# Patient Record
Sex: MALE | Employment: FULL TIME | URBAN - METROPOLITAN AREA
[De-identification: names, ages, dates, MRNs, and addresses within clinical notes are randomized per-mention and may not be internally consistent; named-entity substitution may affect disease eponyms.]

---

## 2019-04-01 NOTE — PROGRESS NOTES
SUBJECTIVE:   Stewart Painter is a 28 year old male who presents to clinic today to establish care and for annual wellness exam.    # Insomnia  - sleep is inconsistent  - has tried for the past few weeks to maintain a set time to go to sleep and wake up without significant improvement  - typically goes to sleep at 12:30  - typically gets up at 08:30 but if he had a particularly restless night will stay in bed until 10:00  -  Some weeks will have a few nights will wake up one or a few times and have the belief that he is somewhere else, usually related to some recent event or stressor. For example, recently went fishing and that week after he returned home would wake up and have belief that he was still fishing. After a couple of minutes will realize he's at home and return to bed.   - does not feel that he is necessarily acting out a dream as he is awake and aware of what he is doing during this times  - will sometimes have the impression that there is something he should be doing  - no visual or auditory hallucinations that he remembers  - also has intermittent shaking of his right foot throughout the night (has recorded himself sleeping and seen it and has also been commented on by others in the past). As far as he is aware this is not disturbing his sleep  - no other nocturnal awakenings  - when he wakes in the morning, bed sheets are not particularly disheveled and sleep partners in the past have not mentioned that he is particularly active when sleeping    - typically one cup of coffee in the morning and no other caffeine the rest of the night  - one or two alcoholic drinks occasionally (average once a week)  - takes 0.5mg melatonin occasionally for sleep, no other sleep aids     # Health Maintenance  - HIV Screening: negative in April 2018, declines repeat testing  - STI Screening: declines  - BP:   BP Readings from Last 3 Encounters:   04/02/19 138/78   - Cholesterol: no indication  - Diabetes Screening: will  do today  - (+) seatbelt use, (+) helmet, (+) smoke detector  - Exercise: bikes and weight lifting 6 days a week    Today's PHQ-2 Score:   PHQ-2 ( 1999 Pf[employer]) 4/2/2019   Q1: Little interest or pleasure in doing things 0   Q2: Feeling down, depressed or hopeless 0   PHQ-2 Score 0     Review of Systems:   Constitutional - no fevers, chills, night sweats, unintentional weight loss/gain   Eyes - no vision concerns   Ears/Nose/Throat - no hearing concerns, no dysphagia/odynophagia   Cardiovascular - no chest pain, palpitations   Pulmonary - no shortness of breath, wheezing, coughing   GI - no abdominal pain, constipation, diarrhea, nausea, vomiting    - no dysuria, polyuria, hematuria   Musculoskeletal - no joint or muscle pain  Integument - no rash   Neuro - no weakness, numbness, paresthesia   Heme - no easy bruising/bleeding   Endocrine - no polyuria, weight loss/gain, dry skin, excessive sweating, hair loss   Psychiatric - no feelings of depressed mood or anhedonia in past 2 weeks   Allergic/Immunologic - no history of anaphylaxis, no history of recurrent infections    Past Medical History:   Diagnosis Date     Single kidney      Past Surgical History:   Procedure Laterality Date     NEPHRECTOMY Right     soon after birth     Family History   Problem Relation Age of Onset     Hypertension Mother      No Known Problems Father      No Known Problems Sister      Heart Disease Maternal Grandfather 86     Heart Disease Paternal Grandmother 87     Heart Disease Paternal Grandfather 62     Diabetes No family hx of      Prostate Cancer No family hx of      Colon Cancer No family hx of      Social History     Tobacco Use     Smoking status: Never Smoker     Smokeless tobacco: Never Used   Substance Use Topics     Alcohol use: Yes     Frequency: 2-4 times a month     Drinks per session: 1 or 2     Binge frequency: Never     Drug use: No     Social History     Social History Germán    Works as a  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      I have reviewed the patient's past medical, surgical, family, and social history.     OBJECTIVE:   /78 (BP Location: Right arm, Patient Position: Sitting, Cuff Size: Adult Regular)   Pulse 59   Temp 97.7  F (36.5  C) (Oral)   Resp 16   Ht 1.954 m (6' 4.93\")   Wt 91.5 kg (201 lb 12 oz)   SpO2 99%   BMI 23.97 kg/m      Constitutional: well-appearing, appears stated age  Eyes: conjunctivae without erythema, sclera anicteric. Pupils equal, round, and reactive to light.   ENT: oropharynx clear, TM grey bilateral  Cardiac: regular rate and rhythm, normal S1/S2, no murmur/rubs/gallops  Respiratory: lungs clear to auscultation bilaterally, normal work of breathing, no wheezes/crackles  GI: abdomen soft, non-tender, non-distended  Extremities: warm and well perfused, radial pulses 2+ and equal, cap refill brisk.  Lymph: no cervical or supraclavicular lymphadenopathy  Skin: no rashes, lesions, or wounds  Psych: affect is full and appropriate, speech is fluent and non-pressured    ASSESSMENT AND PLAN:     COUNSELING:   Reviewed preventive health counseling, as reflected in patient instructions       Regular exercise       Healthy diet/nutrition    (Z00.00) Visit for well man health check  (primary encounter diagnosis)  Comment: Age appropriate screening and counseling provided. Non-smoker. Depression screening negative. UTD on vaccines. Diabetes screening today. No indication for early lipid testing. Physically active, normal BMI, no worrisome family history.   Plan: Hemoglobin A1c ([facility])          (G47.19) Excessive daytime sleepiness  (G47.50) Parasomnia  Comment: Interesting history of what sounds to " be a parasomnia and periodic limb movement. Will have him meet with sleep to discuss PSG.   Plan: SLEEP EVALUATION & MANAGEMENT REFERRAL - ADULT         -Other (Respond in commments)    (Z90.5) Single kidney  Comment: Check baseline creatinine but historically has been stable with yearly checks.   Plan: Basic metabolic panel          (R03.0) Elevated blood pressure reading without diagnosis of hypertension  Comment: High for his age and overall health. I have asked that he purchase an omron BP cuff and begin weekly self monitoring with instructions to call the clinic if his SBP >130 at home or DBP >80 on a regular basis.   Plan: Basic metabolic panel          Kaiser Davila  [facility]  04/02/2019, 8:11 AM

## 2019-04-02 ENCOUNTER — OFFICE VISIT (OUTPATIENT)
Dept: FAMILY MEDICINE | Facility: CLINIC | Age: 29
End: 2019-04-02
Payer: COMMERCIAL

## 2019-04-02 VITALS
TEMPERATURE: 97.7 F | OXYGEN SATURATION: 99 % | RESPIRATION RATE: 16 BRPM | HEIGHT: 77 IN | DIASTOLIC BLOOD PRESSURE: 78 MMHG | HEART RATE: 59 BPM | SYSTOLIC BLOOD PRESSURE: 138 MMHG | BODY MASS INDEX: 23.82 KG/M2 | WEIGHT: 201.75 LBS

## 2019-04-02 DIAGNOSIS — Z90.5 SINGLE KIDNEY: ICD-10-CM

## 2019-04-02 DIAGNOSIS — G47.19 EXCESSIVE DAYTIME SLEEPINESS: ICD-10-CM

## 2019-04-02 DIAGNOSIS — R03.0 ELEVATED BLOOD PRESSURE READING WITHOUT DIAGNOSIS OF HYPERTENSION: ICD-10-CM

## 2019-04-02 DIAGNOSIS — Z00.00 VISIT FOR WELL MAN HEALTH CHECK: Primary | ICD-10-CM

## 2019-04-02 DIAGNOSIS — G47.50 PARASOMNIA: ICD-10-CM

## 2019-04-02 LAB
ANION GAP SERPL CALCULATED.3IONS-SCNC: 7 MMOL/L (ref 3–14)
BUN SERPL-MCNC: 24 MG/DL (ref 7–30)
CALCIUM SERPL-MCNC: 9.1 MG/DL (ref 8.5–10.1)
CHLORIDE SERPL-SCNC: 106 MMOL/L (ref 94–109)
CO2 SERPL-SCNC: 27 MMOL/L (ref 20–32)
CREAT SERPL-MCNC: 1.16 MG/DL (ref 0.66–1.25)
GFR SERPL CREATININE-BSD FRML MDRD: 84 ML/MIN/{1.73_M2}
GLUCOSE SERPL-MCNC: 91 MG/DL (ref 70–99)
HBA1C MFR BLD: 5.2 % (ref 4.1–5.7)
POTASSIUM SERPL-SCNC: 4.3 MMOL/L (ref 3.4–5.3)
SODIUM SERPL-SCNC: 140 MMOL/L (ref 133–144)

## 2019-04-02 RX ORDER — MULTIVITAMIN WITH IRON
300 TABLET ORAL DAILY
COMMUNITY

## 2019-04-02 SDOH — HEALTH STABILITY: MENTAL HEALTH: HOW MANY STANDARD DRINKS CONTAINING ALCOHOL DO YOU HAVE ON A TYPICAL DAY?: 1 OR 2

## 2019-04-02 SDOH — HEALTH STABILITY: MENTAL HEALTH: HOW OFTEN DO YOU HAVE 6 OR MORE DRINKS ON ONE OCCASION?: NEVER

## 2019-04-02 SDOH — HEALTH STABILITY: MENTAL HEALTH: HOW OFTEN DO YOU HAVE A DRINK CONTAINING ALCOHOL?: 2-4 TIMES A MONTH

## 2019-04-02 ASSESSMENT — MIFFLIN-ST. JEOR: SCORE: 1996.38

## 2019-04-17 ENCOUNTER — PRE VISIT (OUTPATIENT)
Dept: SLEEP MEDICINE | Facility: CLINIC | Age: 29
End: 2019-04-17

## 2019-04-17 NOTE — TELEPHONE ENCOUNTER
1.  Reason for the visit:  Insomnia  - sleep is inconsistent  - has tried for the past few weeks to maintain a set time to go to sleep and wake up without significant improvement  - typically goes to sleep at 12:30  - typically gets up at 08:30 but if he had a particularly restless night will stay in bed until 10:00  -  Some weeks will have a few nights will wake up one or a few times and have the belief that he is somewhere else, usually related to some recent event or stressor. For example, recently went fishing and that week after he returned home would wake up and have belief that he was still fishing. After a couple of minutes will realize he's at home and return to bed.   - does not feel that he is necessarily acting out a dream as he is awake and aware of what he is doing during this times  - will sometimes have the impression that there is something he should be doing  - no visual or auditory hallucinations that he remembers  - also has intermittent shaking of his right foot throughout the night (has recorded himself sleeping and seen it and has also been commented on by others in the past). As far as he is aware this is not disturbing his sleep  - no other nocturnal awakenings  - when he wakes in the morning, bed sheets are not particularly disheveled and sleep partners in the past have not mentioned that he is particularly active when sleeping     - typically one cup of coffee in the morning and no other caffeine the rest of the night  - one or two alcoholic drinks occasionally (average once a week)  - takes 0.5mg melatonin occasionally for sleep, no other sleep aids    2.  Referring provider and clinic name:  Dr. Davila [facility]  3.  Previous Sleep Doctor or Pulmonlogist (clinic name)?  None  4.  Records, Procedures, Imaging, and Labs (see below)  No records to obtain        All NOTES from previous office visits that pertain to why they are being seen in the Sleep Center    Previous  "Sleep Studies, Chest CT, Echos and reports that pertain to why they are seeing Sleep Center    All Sleep records that have been done in the last 2 years that pertain to why they are seeing Sleep Center            Are they being seen for continuation of care for Cpap/Bipap/Avap/Trilogy/Dental Device? none    If yes to above Who and Where was Device issued/currently getting supplies from? na    Are you currently on \"Supplemental Oxygen\" during the day or night?   na                                                                                                                                                      Please remind pt to bring Cpap machine and ask to arrive 15 minutes early to appointment due traffic and congestion                                                 5. Pt Sleep Center Packet received Message left asking pt to arrive 30 minutes early to appointment if no packet received.        Yes: \"please make sure that you bring this to your appointment completed, either the doctor will not see you until this completed or you may be asked to reschedule your appointment.\"     No: mail or email to the pt and explain, \"please make sure that you bring this to your appointment completed, either the doctor will not see you until this completed or you may be asked to reschedule your appointment.\"     ~If pt coming early to fill packet out, ask that they come 30 minutes prior to their appointment~     6. Has the pt's medication list been updated and preferred pharmacy added?     7. Has the allergy list been reviewed?    \"Thank you for choosing [facility] and we look forward to seeing you at your upcoming appointment\"   "

## 2019-04-25 ENCOUNTER — OFFICE VISIT (OUTPATIENT)
Dept: SLEEP MEDICINE | Facility: CLINIC | Age: 29
End: 2019-04-25
Attending: FAMILY MEDICINE
Payer: COMMERCIAL

## 2019-04-25 ENCOUNTER — MYC MEDICAL ADVICE (OUTPATIENT)
Dept: SLEEP MEDICINE | Facility: CLINIC | Age: 29
End: 2019-04-25

## 2019-04-25 VITALS
HEIGHT: 77 IN | WEIGHT: 203 LBS | BODY MASS INDEX: 23.97 KG/M2 | SYSTOLIC BLOOD PRESSURE: 119 MMHG | HEART RATE: 63 BPM | OXYGEN SATURATION: 98 % | RESPIRATION RATE: 16 BRPM | DIASTOLIC BLOOD PRESSURE: 75 MMHG

## 2019-04-25 DIAGNOSIS — G47.8 NON-RESTORATIVE SLEEP: ICD-10-CM

## 2019-04-25 DIAGNOSIS — G25.81 RESTLESS LEGS SYNDROME (RLS): Primary | ICD-10-CM

## 2019-04-25 DIAGNOSIS — G47.51 CONFUSIONAL AROUSALS: ICD-10-CM

## 2019-04-25 PROCEDURE — 99215 OFFICE O/P EST HI 40 MIN: CPT | Performed by: NURSE PRACTITIONER

## 2019-04-25 ASSESSMENT — MIFFLIN-ST. JEOR: SCORE: 2003.18

## 2019-04-25 NOTE — PROGRESS NOTES
CC:-Asked to see Stewart Painter (prefers Stewart)  by provider Raul Davila in consultation for problems with nonrestorative sleep with fatigue or tiredness and awakening with a headache, complaints of foot twitching, and video recorded evidence of parasomnias      HPI:Symptoms: He has been partaking in a multi faceted number of programs to address his sleep problems over the last few months.  He has been keeping sleep diaries and following rules with regard to amount of alcohol, use of bedtime, and tracking his feelings of fatigue and nonrestorative sleep along with a headache on a regular basis.  He does feel that on weekends his sleep is likely better.  Reports a long history of parasomnias with not leaving the bed.  Currently he experiences these behaviors most likely nights between Sunday and Thursday occurring within the first third of the night and may consist of leaning over the edge of the bed to arrange something on the floor or sitting up and appearing to be having a conversation.  He does have a predisposition for parasomnias with a family history of sleepwalking he is likely primed for these events by possibly alcohol within 3 to 4 hours of bedtime, and a regular sleep-wake schedule, or insufficient sleep at times.  The events are likely precipitated with stress and/or his restless legs.  At this point he has not left the bed.    He does complain of usually right foot twitching that occurs as he starts to relax at the end of the day.  This may persist throughout his attempt to fall asleep or he feels like it may start up after he has fallen asleep it is always gone when he wakes up in the morning, he is also experiencing this if he should be on a plane ride.  He denies any difficulties with sleep apnea hypercapnia syndrome symptoms with the exception of occasionally a dry throat in the morning.      Schedule: He gets into bed because he feels he is ready for sleep, somewhere between 1130 and 12 on  most work days, on occasion between 1230 and 1 in the morning.  Weekends enters bed between 1 and 2 AM.  Exits bed between 9 and 1030 on workdays.  He has a meeting that starts at 11 AM and he needs to be up for that.  The decision to sleep later in the morning is based on how he feels when he gets up at 9: Headache, fatigue, excessive sleepiness at that time will have him go back to bed and reset the alarm for 1030.  Weekends exits bed between 10 and 11 AM.  Wake ups 1-5 times per night that he knows of he remembers them intentionally for the next morning, but currently is not checking the clock, when he has to wake up he does not ruminate nor does he go through to do this.  On his mind may be the impact on his training (physical training and).  He also may wake up due to psychological issues which includes problems with work projects or could be discomfort.  Total sleep time is estimated on work nights between 5 and 6 hours, and leak in 7 to 8 hours.  He feels a specific SH, does nap 4-5 times a week for about 20 to 30 minutes.  Feels better after a nap.  Activities in bed to include reading and using a Odette that he is changed the back light to have the least amount of effect on his sleep.    Studies: None      Personal, social and Family hx: He reports that on bad mornings he will have a headache and that his performance at work will be less.  20 out of 30 days he is tired and fatigued, 5 out of 30 days he may have some concerns about work but he denies a diagnosis of anxiety or depression.  Alcohol use 1-2 nights out of the work week he may have 1-2 drinks between 7 and 8 PM; on 2 out of 4 weekends will have between 3 and 4 drinks per night and is usually done by 11 PM.  [place] Sleepiness Scale is 8/24, PALOMA scale is 13 out of 28 and he does state that this is improved with some of the sleep hygiene measures that he has been doing.  Family history: Sleep apnea, snoring and sleep walking  ...Scanned in sleep  consult note reviewed:    ROS 14 point comprehensive review of systems completed negative with exception of the following headaches  Status post nephrectomy with a normal creatinine at this point in time.      Allergies:    No Known Allergies    Medications:    Current Outpatient Medications   Medication Sig Dispense Refill     CREATINE PO Take 5 mg by mouth daily       magnesium oxide 200 MG TABS Take 400 mg by mouth nightly as needed       melatonin 1 MG TABS tablet Take 0.5 mg by mouth nightly as needed for sleep       Omega-3 Fatty Acids (FISH OIL CONCENTRATE) 300 MG CAPS Take 300 mg by mouth daily         Problem List:  There are no active problems to display for this patient.       Past Medical/Surgical History:  Past Medical History:   Diagnosis Date     Acute renal failure (ARF) (H) 1989    Hospitalized at [place]     Single kidney      Past Surgical History:   Procedure Laterality Date     NEPHRECTOMY Right     soon after birth       Family History   Problem Relation Age of Onset     Hypertension Mother      No Known Problems Father      No Known Problems Sister      Heart Disease Maternal Grandfather 86     Heart Disease Paternal Grandmother 87     Heart Disease Paternal Grandfather 62     Diabetes No family hx of      Prostate Cancer No family hx of      Colon Cancer No family hx of      Social History     Socioeconomic History     Marital status: Single     Spouse name: Not on file     Number of children: Not on file     Years of education: Not on file     Highest education level: Not on file   Occupational History     Not on file   Social Needs     Financial resource strain: Not on file     Food insecurity:     Worry: Not on file     Inability: Not on file     Transportation needs:     Medical: Not on file     Non-medical: Not on file   Tobacco Use     Smoking status: Never Smoker     Smokeless tobacco: Never Used   Substance and Sexual Activity     Alcohol use: Yes     Frequency: 2-4 times a month      "Drinks per session: 1 or 2     Binge frequency: Never     Drug use: No     Sexual activity: Not Currently     Partners: Female     Birth control/protection: None   Lifestyle     Physical activity:     Days per week: Not on file     Minutes per session: Not on file     Stress: Not on file   Relationships     Social connections:     Talks on phone: Not on file     Gets together: Not on file     Attends Baptism service: Not on file     Active member of club or organization: Not on file     Attends meetings of clubs or organizations: Not on file     Relationship status: Not on file     Intimate partner violence:     Fear of current or ex partner: Not on file     Emotionally abused: Not on file     Physically abused: Not on file     Forced sexual activity: Not on file   Other Topics Concern     Not on file   Social History Narrative    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    Neck Cir (cm): 38 cm    [place] Total Score 4/25/2019   Total score - [place] 8       GENERAL APPEARANCE: healthy, alert and no distress    Assessment/Plan: Is my impression that carried a 28-year-old  has nonrestorative sleep due to the following reasons: RLS, irregular sleep-wake schedule, and confusional arousals.  He has videotaped his confusional arousals over a 1 week period of time and had several in 7 days.  Most individuals who do struggle with confusional arousals to complain of tiredness and fatigue.  In the following plan of care has been developed    1.  RLS: I have ordered a fasting ferritin and TIBC to occur in a routine fashion in the meantime I have suggested he add gabapentin 100 mg 1 p.o. 1.5 hours before bedtime the night, increasing by 1 pill every 3 to 5 days until he " reaches 300 mg.  He will call to update me on the name of his pharmacy that he wishes to use.  He will also involve himself in measures and I will communicate through my chart with him as is necessary  2.  Confusional arousals: I have discussed Pressman's theory of parasomnias which include predisposing priming and precipitating components that bring about these behaviors.  Patient does agree that this is likely a significant issue for him and that treating his restless legs and also regulating his sleep schedule will likely improve his quality of sleep as well as decrease the frequency of these events.  3.  Nonrestorative sleep: See #1 and #2 above I have him keeping sleep diaries for approximately 4 weeks and will see me back in clinic if measures to improve #1 and #2 above have not improved his quality of sleep.    Thank you for allowing me to participate in this kind man's care time spent with patient 60 minutes of which greater than 50% was spent in counseling education and coordination of care. end of dictation          CC: Kaiser Davila

## 2019-04-25 NOTE — PATIENT INSTRUCTIONS
Avoid alcohol within 3-4 hrs of bedtime    The following is recommended:  1)  Fasting ferritin and TIBC.     Ferrous sulfate 325mg and 500mg of vitamin C _____ daily. Most common side effects is constipation, which can be treated with increasing water and fiber or adding miralax if needed.   An iron infusion is also a possibility, but there are some side effects of this treatment with rare but possible anaphylaxis, so most of my patients elect ferrous sulfate and add gabapentin till the ferritin level rises.    Other treatments that can work well is developing a routine of stretching legs, warm bath, massage of legs with lotion, all before bedtime.  Avoiding excessive caffeine, alcohol can help as well.    Gabapentin 100mg pill, 1.5 hrs before bedtime. 1 pill for 3-5 days, if needed increase to 2 pills, then after 3-5 days can increase to 3 pills if needed.  No driving after taking gabapentin.     Let me know what pharmacy you would like this to go to.  My chart me with your pharmacy    Continue to:  Use bed for sleep or sex  Avoiding devices in bed    Trying:  Winding down period before bedtime with something relaxing,enjoyable, mentally distracting to avoid work thoughts    Cancel the follow up if not needed.      Your BMI is Body mass index is 24.07 kg/m .  Weight management is a personal decision.  If you are interested in exploring weight loss strategies, the following discussion covers the approaches that may be successful. Body mass index (BMI) is one way to tell whether you are at a healthy weight, overweight, or obese. It measures your weight in relation to your height.  A BMI of 18.5 to 24.9 is in the healthy range. A person with a BMI of 25 to 29.9 is considered overweight, and someone with a BMI of 30 or greater is considered obese. More than two-thirds of American adults are considered overweight or obese.  Being overweight or obese increases the risk for further weight gain. Excess weight may lead to  heart disease and diabetes.  Creating and following plans for healthy eating and physical activity may help you improve your health.  Weight control is part of healthy lifestyle and includes exercise, emotional health, and healthy eating habits. Careful eating habits lifelong are the mainstay of weight control. Though there are significant health benefits from weight loss, long-term weight loss with diet alone may be very difficult to achieve- studies show long-term success with dietary management in less than 10% of people. Attaining a healthy weight may be especially difficult to achieve in those with severe obesity. In some cases, medications, devices and surgical management might be considered.  What can you do?  If you are overweight or obese and are interested in methods for weight loss, you should discuss this with your provider.     Consider reducing daily calorie intake by 500 calories.     Keep a food journal.     Avoiding skipping meals, consider cutting portions instead.    Diet combined with exercise helps maintain muscle while optimizing fat loss. Strength training is particularly important for building and maintaining muscle mass. Exercise helps reduce stress, increase energy, and improves fitness. Increasing exercise without diet control, however, may not burn enough calories to loose weight.       Start walking three days a week 10-20 minutes at a time    Work towards walking thirty minutes five days a week     Eventually, increase the speed of your walking for 1-2 minutes at time    In addition, we recommend that you review healthy lifestyles and methods for weight loss available through the National Institutes of Health patient information sites:  http://win.niddk.nih.gov/publications/index.htm    And look into health and wellness programs that may be available through your health insurance provider, employer, local community center, or araseli club.

## 2019-04-29 ENCOUNTER — TELEPHONE (OUTPATIENT)
Dept: SLEEP MEDICINE | Facility: CLINIC | Age: 29
End: 2019-04-29

## 2019-04-29 ENCOUNTER — NURSE TRIAGE (OUTPATIENT)
Dept: NURSING | Facility: CLINIC | Age: 29
End: 2019-04-29

## 2019-04-29 RX ORDER — GABAPENTIN 100 MG/1
CAPSULE ORAL
Qty: 90 CAPSULE | Refills: 3 | Status: SHIPPED | OUTPATIENT
Start: 2019-04-29 | End: 2019-08-06

## 2019-04-29 NOTE — TELEPHONE ENCOUNTER
Patient would like prescription for gabapentin sent to [facility] at [address] [place] [address] [place].  Routed to Provider.

## 2019-04-29 NOTE — TELEPHONE ENCOUNTER
Clinic Action Needed:  Yes  Reason for Call:  Patient would like prescription for gabapentin sent to [facility] at [address] [facility].  Any questions, please contact patient at  [phone]; okay to leave a message at that number.  Routed to:  Provider    Please close encounter when completed.

## 2019-04-30 NOTE — TELEPHONE ENCOUNTER
RX for gabapentin has been called into [facility] [address] per patients request.    Alix Sheridan [facility] ~[place]

## 2019-05-01 DIAGNOSIS — G25.81 RESTLESS LEGS SYNDROME (RLS): ICD-10-CM

## 2019-05-01 LAB
FERRITIN SERPL-MCNC: 74 NG/ML (ref 26–388)
IRON SATN MFR SERPL: 23 % (ref 15–46)
IRON SERPL-MCNC: 85 UG/DL (ref 35–180)
TIBC SERPL-MCNC: 366 UG/DL (ref 240–430)

## 2019-05-01 PROCEDURE — 36415 COLL VENOUS BLD VENIPUNCTURE: CPT | Performed by: NURSE PRACTITIONER

## 2019-05-01 PROCEDURE — 83550 IRON BINDING TEST: CPT | Performed by: NURSE PRACTITIONER

## 2019-05-01 PROCEDURE — 83540 ASSAY OF IRON: CPT | Performed by: NURSE PRACTITIONER

## 2019-05-01 PROCEDURE — 82728 ASSAY OF FERRITIN: CPT | Performed by: NURSE PRACTITIONER

## 2019-06-04 DIAGNOSIS — M25.569 KNEE PAIN, UNSPECIFIED CHRONICITY, UNSPECIFIED LATERALITY: Primary | ICD-10-CM

## 2019-06-04 NOTE — TELEPHONE ENCOUNTER
RECORDS RECEIVED FROM:  After a long bike ride my knee was extremely stiff to the point where I could hardly bend it.  During the ride I could feel it seize up at one point. This was roughly a month ago. It is still painful to bike on but is slowly feeling better. I want to make sure it isn't a more severe injury and what I can do to help recovery/strengthen and how to best ease back into cycling   DATE RECEIVED: 6.6.19   NOTES STATUS DETAILS   OFFICE NOTE from referring provider N/A    OFFICE NOTE from other specialist N/A    DISCHARGE SUMMARY from hospital N/A    DISCHARGE REPORT from the ER N/A    OPERATIVE REPORT N/A    MEDICATION LIST N/A    IMPLANT RECORD/STICKER N/A    LABS     CBC/DIFF N/A    CULTURES N/A    INJECTIONS DONE IN RADIOLOGY N/A    MRI N/A    CT SCAN N/A    XRAYS (IMAGES & REPORTS) N/A    TUMOR     PATHOLOGY  Slides & report N/A

## 2019-06-06 ENCOUNTER — PRE VISIT (OUTPATIENT)
Dept: ORTHOPEDICS | Facility: CLINIC | Age: 29
End: 2019-06-06

## 2019-06-06 ENCOUNTER — OFFICE VISIT (OUTPATIENT)
Dept: ORTHOPEDICS | Facility: CLINIC | Age: 29
End: 2019-06-06
Payer: COMMERCIAL

## 2019-06-06 ENCOUNTER — ANCILLARY PROCEDURE (OUTPATIENT)
Dept: GENERAL RADIOLOGY | Facility: CLINIC | Age: 29
End: 2019-06-06
Payer: COMMERCIAL

## 2019-06-06 VITALS — WEIGHT: 195 LBS | HEIGHT: 77 IN | BODY MASS INDEX: 23.02 KG/M2

## 2019-06-06 DIAGNOSIS — M25.569 KNEE PAIN, UNSPECIFIED CHRONICITY, UNSPECIFIED LATERALITY: ICD-10-CM

## 2019-06-06 DIAGNOSIS — M22.2X1 PATELLOFEMORAL PAIN SYNDROME OF RIGHT KNEE: Primary | ICD-10-CM

## 2019-06-06 ASSESSMENT — MIFFLIN-ST. JEOR: SCORE: 1966.89

## 2019-06-06 NOTE — PROGRESS NOTES
Subjective:   Stewart Painter is a 28 year old male who complains of right knee pain. He states that an original injury was 2.5 years ago while doing dead lifts, took two weeks off to recover. In April, he did an 80 mile bike ride and started noticing the pain about half way through. About 15 minutes after the ride, his knee completely stiffened up and he was unable to move it.     Sid has been cycling for the past 2 years.  He moved from [facility] to [facility] in November 2018.  He was training for a longer ride in July and had a series of rides starting in late March 2019.  He was escalating his rides from 30 miles to 50 miles to 75 miles and then to 80 miles.  He noted that he had some right knee stiffness at the end of his 75 mile ride.  The next week he undertook an 80 mile ride and at about 40 to 45 miles he started having onset of knee stiffness and then had to turn around and return home another 40 miles.  He denies swelling or locking or instability.  His radiographs today demonstrate no abnormality.  He states that he is had his bike fit in the past.  He has not changed his shoe wear or his toe clips.    Background:   Date of injury: 2016   Duration of symptoms: 2 years  Mechanism of Injury: Chronic; Activity related  Aggravating factors: Biking, walking for long periods  Relieving Factors: ice  Prior Evaluation: None    PAST MEDICAL, SOCIAL, SURGICAL AND FAMILY HISTORY: He  has a past medical history of Acute renal failure (ARF) (H) (1989) and Single kidney.  He  has a past surgical history that includes Nephrectomy (Right).  His family history includes Heart Disease (age of onset: 62) in his paternal grandfather; Heart Disease (age of onset: 86) in his maternal grandfather; Heart Disease (age of onset: 87) in his paternal grandmother; Hypertension in his mother; No Known Problems in his father and sister.  He reports that he has never smoked. He has never used smokeless tobacco. He  "reports that he drinks alcohol. He reports that he does not use drugs.    ALLERGIES: He has No Known Allergies.    CURRENT MEDICATIONS: He has a current medication list which includes the following prescription(s): creatine, gabapentin, magnesium oxide, melatonin, and fish oil concentrate.     REVIEW OF SYSTEMS: 12 point review of systems is negative except as noted above.     Exam:   Ht 1.956 m (6' 5\")   Wt 88.5 kg (195 lb)   BMI 23.12 kg/m        CONSTITUTIONAL: healthy, alert and no distress  SKIN: no suspicious lesions or rashes  GAIT: normal  NEUROLOGIC: Non-focal  PSYCHIATRIC: affect normal/bright and mentation appears normal.    MUSCULOSKELETAL:   RIGHT KNEE: Comprehensive knee examination demonstrates FROM, (-) effusion, (-) medial/lateral patellar facet tenderness, (-) patellar inhibition, (-) apprehension; (-) pain or laxity with valgus stress testing in 0 or 30 degrees of knee flexion, (-) pain or laxity with varus stress testing in 0 or 30 degrees of knee flexion, (-) lachman, (-) pivot shift, (-) PD; (-) medial joint line tenderness, (-) lateral joint line tenderness, (-) bounce test, (-) Juan M test.       Assessment/Plan:   (M22.2X1) Patellofemoral pain syndrome of right knee  (primary encounter diagnosis)  Comment: By history Sid had some overuse and patellofemoral irritation.  This may be related to his bike fit but may also be related to some gluteal weakness on the right side as this relates to him spending more time off of his seat on a longer ride.  I am going to have him see Waldo for a bike fit as well as a home gluteal strengthening program.  All questions were answered.  No follow-up is planned.  He may benefit from some patellar taping and they can determine this as well as his interest in this and therapy.  Plan: PHYSICAL THERAPY REFERRAL (Internal)                  "

## 2019-06-06 NOTE — LETTER
6/6/2019      RE: Stewart Painter  [address] [id number]        Subjective:   Stewart Painter is a 28 year old male who complains of right knee pain. He states that an original injury was 2.5 years ago while doing dead lifts, took two weeks off to recover. In April, he did an 80 mile bike ride and started noticing the pain about half way through. About 15 minutes after the ride, his knee completely stiffened up and he was unable to move it.     Sid has been cycling for the past 2 years.  He moved from [facility] to [facility] in November 2018.  He was training for a longer ride in July and had a series of rides starting in late March 2019.  He was escalating his rides from 30 miles to 50 miles to 75 miles and then to 80 miles.  He noted that he had some right knee stiffness at the end of his 75 mile ride.  The next week he undertook an 80 mile ride and at about 40 to 45 miles he started having onset of knee stiffness and then had to turn around and return home another 40 miles.  He denies swelling or locking or instability.  His radiographs today demonstrate no abnormality.  He states that he is had his bike fit in the past.  He has not changed his shoe wear or his toe clips.    Background:   Date of injury: 2016   Duration of symptoms: 2 years  Mechanism of Injury: Chronic; Activity related  Aggravating factors: Biking, walking for long periods  Relieving Factors: ice  Prior Evaluation: None    PAST MEDICAL, SOCIAL, SURGICAL AND FAMILY HISTORY: He  has a past medical history of Acute renal failure (ARF) (H) (1989) and Single kidney.  He  has a past surgical history that includes Nephrectomy (Right).  His family history includes Heart Disease (age of onset: 62) in his paternal grandfather; Heart Disease (age of onset: 86) in his maternal grandfather; Heart Disease (age of onset: 87) in his paternal grandmother; Hypertension in his mother; No Known Problems in his father and  "sister.  He reports that he has never smoked. He has never used smokeless tobacco. He reports that he drinks alcohol. He reports that he does not use drugs.    ALLERGIES: He has No Known Allergies.    CURRENT MEDICATIONS: He has a current medication list which includes the following prescription(s): creatine, gabapentin, magnesium oxide, melatonin, and fish oil concentrate.     REVIEW OF SYSTEMS: 12 point review of systems is negative except as noted above.     Exam:   Ht 1.956 m (6' 5\")   Wt 88.5 kg (195 lb)   BMI 23.12 kg/m         CONSTITUTIONAL: healthy, alert and no distress  SKIN: no suspicious lesions or rashes  GAIT: normal  NEUROLOGIC: Non-focal  PSYCHIATRIC: affect normal/bright and mentation appears normal.    MUSCULOSKELETAL:   RIGHT KNEE: Comprehensive knee examination demonstrates FROM, (-) effusion, (-) medial/lateral patellar facet tenderness, (-) patellar inhibition, (-) apprehension; (-) pain or laxity with valgus stress testing in 0 or 30 degrees of knee flexion, (-) pain or laxity with varus stress testing in 0 or 30 degrees of knee flexion, (-) lachman, (-) pivot shift, (-) PD; (-) medial joint line tenderness, (-) lateral joint line tenderness, (-) bounce test, (-) Juan M test.       Assessment/Plan:   (M22.2X1) Patellofemoral pain syndrome of right knee  (primary encounter diagnosis)  Comment: By history Sid had some overuse and patellofemoral irritation.  This may be related to his bike fit but may also be related to some gluteal weakness on the right side as this relates to him spending more time off of his seat on a longer ride.  I am going to have him see Waldo for a bike fit as well as a home gluteal strengthening program.  All questions were answered.  No follow-up is planned.  He may benefit from some patellar taping and they can determine this as well as his interest in this and therapy.  Plan: PHYSICAL THERAPY REFERRAL (Internal)                    Ben Haro MD    "

## 2019-06-17 ENCOUNTER — OFFICE VISIT (OUTPATIENT)
Dept: SLEEP MEDICINE | Facility: CLINIC | Age: 29
End: 2019-06-17
Payer: COMMERCIAL

## 2019-06-17 VITALS
OXYGEN SATURATION: 98 % | HEART RATE: 71 BPM | BODY MASS INDEX: 23.26 KG/M2 | SYSTOLIC BLOOD PRESSURE: 119 MMHG | WEIGHT: 197 LBS | DIASTOLIC BLOOD PRESSURE: 74 MMHG | HEIGHT: 77 IN | RESPIRATION RATE: 16 BRPM

## 2019-06-17 DIAGNOSIS — G25.81 RESTLESS LEGS SYNDROME (RLS): ICD-10-CM

## 2019-06-17 DIAGNOSIS — G47.59 OTHER PARASOMNIA: ICD-10-CM

## 2019-06-17 DIAGNOSIS — G47.23 CIRCADIAN RHYTHM SLEEP DISORDER, IRREGULAR SLEEP WAKE TYPE: Primary | ICD-10-CM

## 2019-06-17 DIAGNOSIS — G47.8 UNHEALTHY SLEEP HABIT: ICD-10-CM

## 2019-06-17 PROCEDURE — 99214 OFFICE O/P EST MOD 30 MIN: CPT | Performed by: NURSE PRACTITIONER

## 2019-06-17 ASSESSMENT — MIFFLIN-ST. JEOR: SCORE: 1975.97

## 2019-06-17 NOTE — PROGRESS NOTES
Addended by: JAIMIE STEWART on: 4/29/2019 05:19 PM        Modules accepted: Orders, SmartSet        CC:-Asked to see Stewart Painter (prefers Stewart)  by provider Raul Davila in consultation for problems with nonrestorative sleep with fatigue or tiredness and awakening with a headache, complaints of foot twitching, and video recorded evidence of parasomnias with +family hx of sleep walking.    HPI:Symptoms: He has been partaking in a multi faceted number of programs to address his sleep problems over the last few months.  He has been keeping sleep diaries and following rules with regard to amount of alcohol, use of bedtime, and tracking his feelings of fatigue and nonrestorative sleep along with a headache on a regular basis.  He does feel that on weekends his sleep is likely better.    Reports a long history of parasomnias with not leaving the bed.  Currently he experiences these behaviors most likely nights between Sunday and Thursday occurring within the first third of the night and may consist of leaning over the edge of the bed to arrange something on the floor or sitting up and appearing to be having a conversation.  He does have a predisposition for parasomnias with a family history of sleepwalking he is likely primed for these events by possibly alcohol within 3 to 4 hours of bedtime, and a regular sleep-wake schedule, or insufficient sleep at times.  The events are likely precipitated with stress and/or his restless legs.      He does complain of usually right foot twitching that occurs as he starts to relax at the end of the day.  This may persist throughout his attempt to fall asleep or he feels like it may start up after he has fallen asleep it is always gone when he wakes up in the morning, he is also experiencing this if he should be on a plane ride.  He denies any difficulties with sleep apnea hyponea syndrome symptoms with the exception of occasionally a dry throat in the morning.     6//17/19 visit  "today: (changes: confined alcohol to weekends, treated rls, feels much better)    Sleep schedule is irregular with regard to entering bed later on weekends, by up to 4 hrs , and minimal delay in rise time from 9A to 11A on weekends, resulting in significant loss of TST from 7-9 hrs down to 3-6 hrs some weekend nights.  RLS effecting Sleep latency-much better with ferrous sulfate/vit c and 100mg of  Gabapentin. Odette with back light from bed.  Alcohol with return visit on weekend night4-5 drinks in prebedtime hrs.  Some eveidence of prolonged wakeups on diaries with relationship rumination.    Allergies:    No Known Allergies    Medications:    Current Outpatient Medications   Medication Sig Dispense Refill     CREATINE PO Take 5 mg by mouth daily       gabapentin (NEURONTIN) 100 MG capsule 1 pill 1.5 hrs prior to bedtime, may increase by 1 pill every 3-5 days to a total of 3 if needed 90 capsule 3     magnesium oxide 200 MG TABS Take 400 mg by mouth nightly as needed       melatonin 1 MG TABS tablet Take 0.5 mg by mouth nightly as needed for sleep       Omega-3 Fatty Acids (FISH OIL CONCENTRATE) 300 MG CAPS Take 300 mg by mouth daily         Problem List:  There are no active problems to display for this patient.       Past Medical/Surgical History:  Past Medical History:   Diagnosis Date     Acute renal failure (ARF) (H) 1989    Hospitalized at [facility] kidney      Past Surgical History:   Procedure Laterality Date     NEPHRECTOMY Right     soon after birth     Physical Examination:  Vitals: /74   Pulse 71   Resp 16   Ht 1.956 m (6' 5\")   Wt 89.4 kg (197 lb)   SpO2 98%   BMI 23.36 kg/m    BMI= Body mass index is 23.36 kg/m .      [place] Total Score 6/17/2019   Total score - [place] 5       GENERAL APPEARANCE: healthy, alert and no distress    Problem List:  There are no active problems to display for this patient.       Past Medical/Surgical History:  Past Medical History:   Diagnosis Date     " Acute renal failure (ARF) (H) 1989    Hospitalized at [facility]     Single kidney      Past Surgical History:   Procedure Laterality Date     NEPHRECTOMY Right     soon after birth       Family History   Problem Relation Age of Onset     Hypertension Mother      No Known Problems Father      No Known Problems Sister      Heart Disease Maternal Grandfather 86     Heart Disease Paternal Grandmother 87     Heart Disease Paternal Grandfather 62     Diabetes No family hx of      Prostate Cancer No family hx of      Colon Cancer No family hx of      Social History     Socioeconomic History     Marital status: Single     Spouse name: Not on file     Number of children: Not on file     Years of education: Not on file     Highest education level: Not on file   Occupational History     Not on file   Social Needs     Financial resource strain: Not on file     Food insecurity:     Worry: Not on file     Inability: Not on file     Transportation needs:     Medical: Not on file     Non-medical: Not on file   Tobacco Use     Smoking status: Never Smoker     Smokeless tobacco: Never Used   Substance and Sexual Activity     Alcohol use: Yes     Frequency: 2-4 times a month     Drinks per session: 1 or 2     Binge frequency: Never     Drug use: No     Sexual activity: Not Currently     Partners: Female     Birth control/protection: None   Lifestyle     Physical activity:     Days per week: Not on file     Minutes per session: Not on file     Stress: Not on file   Relationships     Social connections:     Talks on phone: Not on file     Gets together: Not on file     Attends Confucianist service: Not on file     Active member of club or organization: Not on file     Attends meetings of clubs or organizations: Not on file     Relationship status: Not on file     Intimate partner violence:     Fear of current or ex partner: Not on file     Emotionally abused: Not on file     Physically abused: Not on file     Forced sexual activity: Not on file    Other Topics Concern     Not on file   Social History Narrative    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  BMI= There is no height or weight on file to calculate BMI.    Neck Cir (cm): 38 cm    [place] Total Score 4/25/2019   Total score - [place] 8     [place]: 7  GENERAL APPEARANCE: healthy, alert and no distress.     Assessment/Plan: Improved sleep with reduction in [place] scale and ESS.  Parasomnias occurred in 2 recovery nights following weekends with insufficient sleep and alcohol.  Some evidence of sleep loss with relationship rumination.  Discussed social jet lag-sleep on weekend delayed with out of town company-difficulty in decision making  Re: reduce extending bedtime with company discussed.  Discussed rumination impact on sleep continuity and worry time.    Assessment/plan: Improving, follow up with diaries as needed  1. Irregular sleep wake pattern with difficulties with nights of insufficient sleep (social jet lag): discussion as above.   2. Sleep habits: Alcohol: likely limiting TST of weekend nights.Some rumination -instructed in worry time.  3. Rls, much better on 100mg gabapentin, ferrous sulfate and vit c: recheck ferritin in 3 months  4. Parasomnias: reduced with this visit, present on nights following insufficient sleep.    Time spent with patient is 25 minutes, of which >50% was spent in counseling, education and coordination of care.    Insomnia features at baseline include:    Sleep extension at baseline with bad night decided at 9A: if headache, bad night or fatigue with reset alarm to 10:30 (meeting at 11A)  Worry: some, relationships  Worry about next day's function or effect on health: if multiple  Clock watching: no  Leaving bed: usually  Getting into bed early: no  Activities in bed: screens, eating, etc...before bed: screen-phone, monika; in bed occasional  screens  Medications: gabapentin 1      seeDIARIES: in chart

## 2019-06-17 NOTE — PATIENT INSTRUCTIONS
"Keep wakeup time to < a 3 hr variance  Avoid alcohol within 3 hrs of bedtime  Document parasomnias if occurred    Time spent in bed:  Reduce time in bed not devoted to sleep  Worry time: 3 hrs before bedtime.  Journal : list: to do list for next day, for week, thoughts/feelings/concerns  With wakeup: \"i've already done this work\"  Breathing  4/8 breathing,   Iphone: insight timer-ideas on mental distraction, visualization  Cover your clock-done  Return 3-4 wks      Your BMI is Body mass index is 23.36 kg/m .  Weight management is a personal decision.  If you are interested in exploring weight loss strategies, the following discussion covers the approaches that may be successful. Body mass index (BMI) is one way to tell whether you are at a healthy weight, overweight, or obese. It measures your weight in relation to your height.  A BMI of 18.5 to 24.9 is in the healthy range. A person with a BMI of 25 to 29.9 is considered overweight, and someone with a BMI of 30 or greater is considered obese. More than two-thirds of American adults are considered overweight or obese.  Being overweight or obese increases the risk for further weight gain. Excess weight may lead to heart disease and diabetes.  Creating and following plans for healthy eating and physical activity may help you improve your health.  Weight control is part of healthy lifestyle and includes exercise, emotional health, and healthy eating habits. Careful eating habits lifelong are the mainstay of weight control. Though there are significant health benefits from weight loss, long-term weight loss with diet alone may be very difficult to achieve- studies show long-term success with dietary management in less than 10% of people. Attaining a healthy weight may be especially difficult to achieve in those with severe obesity. In some cases, medications, devices and surgical management might be considered.  What can you do?  If you are overweight or obese and are " interested in methods for weight loss, you should discuss this with your provider.     Consider reducing daily calorie intake by 500 calories.     Keep a food journal.     Avoiding skipping meals, consider cutting portions instead.    Diet combined with exercise helps maintain muscle while optimizing fat loss. Strength training is particularly important for building and maintaining muscle mass. Exercise helps reduce stress, increase energy, and improves fitness. Increasing exercise without diet control, however, may not burn enough calories to loose weight.       Start walking three days a week 10-20 minutes at a time    Work towards walking thirty minutes five days a week     Eventually, increase the speed of your walking for 1-2 minutes at time    In addition, we recommend that you review healthy lifestyles and methods for weight loss available through the National Institutes of Health patient information sites:  http://win.niddk.nih.gov/publications/index.htm    And look into health and wellness programs that may be available through your health insurance provider, employer, local community center, or araseli club.

## 2019-06-28 ENCOUNTER — THERAPY VISIT (OUTPATIENT)
Dept: PHYSICAL THERAPY | Facility: CLINIC | Age: 29
End: 2019-06-28
Payer: COMMERCIAL

## 2019-06-28 DIAGNOSIS — S89.91XA KNEE INJURY, RIGHT, INITIAL ENCOUNTER: Primary | ICD-10-CM

## 2019-06-28 DIAGNOSIS — M22.2X1 PATELLOFEMORAL PAIN SYNDROME OF RIGHT KNEE: ICD-10-CM

## 2019-06-28 PROCEDURE — 97161 PT EVAL LOW COMPLEX 20 MIN: CPT | Mod: GP | Performed by: PHYSICAL THERAPIST

## 2019-06-28 PROCEDURE — 97110 THERAPEUTIC EXERCISES: CPT | Mod: GP | Performed by: PHYSICAL THERAPIST

## 2019-06-28 NOTE — PROGRESS NOTES
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    Sx management strategies and lowest pain rating rest      Objective:  decr hip IR JUAN  Off the Bike measures, as indicated    Flexibility Screen:    Right Left             Hamstring + +   Hip Flexor - -   ITB/Lat Hip in SL + +   Quadriceps - -   Gastroc/ Soleus     - = normal  + = mild tightness  ++ = moderate tightness  +++ = significant tightness    Muscle Strength (x/5)    Right Left   Hip Flex     Hip Ext     Hip ABD 4- 4   Hip ADD     Knee Ext 4 4   Knee Flex     Prone Plank         Dynamic Movement Screen:    2 leg squat: good form no pain    1 leg stance:   Right: good stable surface  Left: good stable surface     1 leg squat:   Right: significant valgus   Left: significant valgus        Objective:  System    Physical Exam    General     ROS    Assessment/Plan:    Patient is a 28 year old male with right side knee  complaints.    Patient has the following significant findings with corresponding treatment plan.                Diagnosis 1:  Right knee PF  Pain -  hot/cold therapy, manual therapy, splint/taping/bracing/orthotics, self management, education and home program  Decreased ROM/flexibility - manual therapy and therapeutic exercise  Decreased strength - therapeutic exercise and therapeutic activities  Decreased function - therapeutic activities    Therapy Evaluation Codes:   1) History comprised of:   Personal factors that impact the plan of care:      None.    Comorbidity factors that impact the plan of care are:      None.     Medications impacting care: None.  2) Examination of Body Systems comprised of:   Body structures and functions that impact the plan of care:      Knee.   Activity limitations that impact the plan of care are:      Sports and Stairs.  3) Clinical presentation characteristics are:   Stable/Uncomplicated.  4) Decision-Making    Low complexity using standardized patient assessment instrument and/or measureable assessment of functional outcome.  Cumulative Therapy Evaluation is: Low complexity.    Previous and current functional limitations:  (See Goal Flow Sheet for this information)    Short term and Long term goals: (See Goal Flow Sheet for this information)     Communication ability:  Patient appears to be able to clearly communicate and understand verbal and written communication and follow directions correctly.  Treatment Explanation - The following has been discussed with the patient:   RX ordered/plan of care  Anticipated outcomes  Possible risks and side effects  This patient would benefit from PT intervention to resume normal activities.   Rehab potential is good.    Frequency:  1 X week, once daily  Duration:  for 8 weeks  Discharge Plan:  Achieve all LTG.  Independent in home treatment program.  Reach maximal therapeutic benefit.    Please refer to the daily flowsheet for treatment today,  total treatment time and time spent performing 1:1 timed codes.

## 2019-07-05 ENCOUNTER — THERAPY VISIT (OUTPATIENT)
Dept: PHYSICAL THERAPY | Facility: CLINIC | Age: 29
End: 2019-07-05
Payer: COMMERCIAL

## 2019-07-05 DIAGNOSIS — S89.91XA KNEE INJURY, RIGHT, INITIAL ENCOUNTER: ICD-10-CM

## 2019-07-05 PROCEDURE — 97530 THERAPEUTIC ACTIVITIES: CPT | Mod: GP | Performed by: PHYSICAL THERAPIST

## 2019-07-05 PROCEDURE — 97110 THERAPEUTIC EXERCISES: CPT | Mod: GP | Performed by: PHYSICAL THERAPIST

## 2019-07-05 PROCEDURE — 97112 NEUROMUSCULAR REEDUCATION: CPT | Mod: GP | Performed by: PHYSICAL THERAPIST

## 2019-07-05 NOTE — PROGRESS NOTES
Subjective:    Dx right PF  Type of bike: giant road SL2        Objective:       Static bike measures measurements:    Seat Level: measure off  if possible Initial ( -3.2degrees) Post  (, -3.2 degrees)               Knee Left/right    Knee flex angle (seat height) 30 degrees/mm seat moved 5mm upand new knee angle26      Seat Position  Seat Fore/aft (good positioning)       Trunk angle 55   Shoulder angle (humerus, T1-7 with axis at GH, goal around 90) 89   Shoulder width (Good width)      left right   Elbow (flexion angle) 0 degrees 0degrees   Wrist position: (neutral) (neutral)         Dynamic Assessment:    Anterior view:  Knee position/pedal stroke: left right    Top( out) bottom( neutral) Top(neutral) bottom( neutral)     Lateral view:  Trunk Position (good position)   Reach (good position)   Knee position (over flexed)       Clinical assessment and changes:    Overall good bike set up but seat seat in lower part of range for condition.  It was raised to accommodate forces and bars were raised 2 spacers to adjust as well.  Still an aggressive seat to bar drop overall but does not report excessive pressure through arms.  Discussion had on unlocking elbows and time spent reviewing video-ant and lat views.

## 2019-07-17 ENCOUNTER — THERAPY VISIT (OUTPATIENT)
Dept: PHYSICAL THERAPY | Facility: CLINIC | Age: 29
End: 2019-07-17
Payer: COMMERCIAL

## 2019-07-17 DIAGNOSIS — S89.91XA KNEE INJURY, RIGHT, INITIAL ENCOUNTER: ICD-10-CM

## 2019-07-17 PROCEDURE — 97530 THERAPEUTIC ACTIVITIES: CPT | Mod: GP | Performed by: PHYSICAL THERAPIST

## 2019-07-17 PROCEDURE — 97110 THERAPEUTIC EXERCISES: CPT | Mod: GP | Performed by: PHYSICAL THERAPIST

## 2019-07-17 NOTE — PROGRESS NOTES
Pt brings touring bike  Surly cross check  Seat level-1.6  Knee angle 30 degrees  KOPS: good position  Good shoulder width and elbow flexion angle 10 degrees  Trunk angle 52 degrees  Shoulder angle 85 degrees  A:  Good positioned bike overall.  No significant changes made  P: cont with strength and mechanics   no

## 2019-09-18 PROBLEM — S89.91XA KNEE INJURY, RIGHT, INITIAL ENCOUNTER: Status: RESOLVED | Noted: 2019-06-28 | Resolved: 2019-09-18

## 2019-11-25 ENCOUNTER — MYC REFILL (OUTPATIENT)
Dept: SLEEP MEDICINE | Facility: CLINIC | Age: 29
End: 2019-11-25

## 2019-11-25 NOTE — TELEPHONE ENCOUNTER
Pending Prescriptions:                       Disp   Refills    gabapentin (NEURONTIN) 100 MG capsule     90 cap*3            Si pill 1.5 hrs prior to bedtime, may increase by           1 pill every 3-5 days to a total of 3 if needed    Last Written Prescription Date:  19  Last Fill Quantity: 90,   # refills: 3  Last Office Visit with [facility] or [facility] prescribing provider: 19  Future Office visit:  No follow up scheduled at this time.    ENRIQUE Vazquez

## 2019-11-26 ENCOUNTER — MYC MEDICAL ADVICE (OUTPATIENT)
Dept: SLEEP MEDICINE | Facility: CLINIC | Age: 29
End: 2019-11-26

## 2019-11-26 DIAGNOSIS — G25.81 RESTLESS LEGS SYNDROME (RLS): Primary | ICD-10-CM

## 2019-11-26 RX ORDER — GABAPENTIN 100 MG/1
CAPSULE ORAL
Qty: 90 CAPSULE | Refills: 3 | Status: SHIPPED | OUTPATIENT
Start: 2019-11-26 | End: 2020-05-11

## 2019-11-26 NOTE — PROGRESS NOTES
Request for refill of 100mg of gabapentin    pcp check: filled 5/19, the 8/19.    Will request if lab work was completed.    WM

## 2020-01-13 DIAGNOSIS — G25.81 RESTLESS LEGS SYNDROME (RLS): ICD-10-CM

## 2020-01-13 LAB
FERRITIN SERPL-MCNC: 138 NG/ML (ref 26–388)
IRON SATN MFR SERPL: 44 % (ref 15–46)
IRON SERPL-MCNC: 149 UG/DL (ref 35–180)
TIBC SERPL-MCNC: 335 UG/DL (ref 240–430)

## 2020-01-13 PROCEDURE — 82728 ASSAY OF FERRITIN: CPT | Performed by: NURSE PRACTITIONER

## 2020-01-13 PROCEDURE — 83540 ASSAY OF IRON: CPT | Performed by: NURSE PRACTITIONER

## 2020-01-13 PROCEDURE — 36415 COLL VENOUS BLD VENIPUNCTURE: CPT | Performed by: NURSE PRACTITIONER

## 2020-01-13 PROCEDURE — 83550 IRON BINDING TEST: CPT | Performed by: NURSE PRACTITIONER

## 2020-05-11 ENCOUNTER — MYC REFILL (OUTPATIENT)
Dept: SLEEP MEDICINE | Facility: CLINIC | Age: 30
End: 2020-05-11

## 2020-05-12 NOTE — TELEPHONE ENCOUNTER
gabapentin (NEURONTIN) 100 MG capsule      Last Written Prescription Date:  11/26/19  Last Fill Quantity: 90,   # refills: 3  Last Office Visit: 6/17/19  Future Office visit:   1 year    Routing refill request to provider for review/approval because:  Drug not on the [facility], [facility] or [facility] refill protocol or controlled substance

## 2020-05-14 ENCOUNTER — MYC MEDICAL ADVICE (OUTPATIENT)
Dept: SLEEP MEDICINE | Facility: CLINIC | Age: 30
End: 2020-05-14

## 2020-05-14 RX ORDER — GABAPENTIN 100 MG/1
CAPSULE ORAL
Qty: 90 CAPSULE | Refills: 1 | Status: SHIPPED | OUTPATIENT
Start: 2020-05-14 | End: 2020-09-17

## 2020-06-23 ENCOUNTER — VIRTUAL VISIT (OUTPATIENT)
Dept: FAMILY MEDICINE | Facility: CLINIC | Age: 30
End: 2020-06-23
Payer: COMMERCIAL
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
## 2020-06-23 PROCEDURE — 99213 OFFICE O/P EST LOW 20 MIN: CPT | Mod: 95 | Performed by: FAMILY MEDICINE

## 2020-06-23 RX ORDER — DOXYCYCLINE 100 MG/1
100 CAPSULE ORAL 2 TIMES DAILY
Qty: 2 CAPSULE | Refills: 0 | Status: SHIPPED | OUTPATIENT
Start: 2020-06-23 | End: 2020-06-24

## 2020-06-23 NOTE — PROGRESS NOTES
"Stewart Painter is a 29 year old male who is being evaluated via a billable video visit.      The patient has been notified of following:     \"This video visit will be conducted via a call between you and your physician/provider. We have found that certain health care needs can be provided without the need for an in-person physical exam.  This service lets us provide the care you need with a video conversation.  If a prescription is necessary we can send it directly to your pharmacy.  If lab work is needed we can place an order for that and you can then stop by our lab to have the test done at a later time.    Video visits are billed at different rates depending on your insurance coverage.  Please reach out to your insurance provider with any questions.    If during the course of the call the physician/provider feels a video visit is not appropriate, you will not be charged for this service.\"    Patient has given verbal consent for Video visit? Yes    Will anyone else be joining your video visit? No    Subjective     Stewart Painter is a 29 year old male who presents today via video visit for the following health issues:    HPI  Chief Complaint   Patient presents with     Insect Bites     possible tick bit, redness x 3 days   Pt came back from [place] on Sunday  Does not Know whether it was a Deer Tick  No Flu Like symptoms  No arthralgia or myalgia  No fever or chills        Video Start Time: 2.41 AM  Patient Active Problem List   Diagnosis   (none) - all problems resolved or deleted     Past Surgical History:   Procedure Laterality Date     NEPHRECTOMY Right     soon after birth       Social History     Tobacco Use     Smoking status: Never Smoker     Smokeless tobacco: Never Used   Substance Use Topics     Alcohol use: Yes     Frequency: 2-4 times a month     Drinks per session: 1 or 2     Binge frequency: Never     Family History   Problem Relation Age of Onset     Hypertension Mother      No Known Problems " "Father      No Known Problems Sister      Heart Disease Maternal Grandfather 86     Heart Disease Paternal Grandmother 87     Heart Disease Paternal Grandfather 62     Diabetes No family hx of      Prostate Cancer No family hx of      Colon Cancer No family hx of          Current Outpatient Medications   Medication Sig Dispense Refill     CREATINE PO Take 5 mg by mouth daily       doxycycline hyclate (VIBRAMYCIN) 100 MG capsule Take 1 capsule (100 mg) by mouth 2 times daily for 1 day 2 capsule 0     gabapentin (NEURONTIN) 100 MG capsule 1 pill 1.5 hrs prior to bedtime, may increase by 1 pill every 3-5 days to a total of 3 if needed 90 capsule 1     magnesium oxide 200 MG TABS Take 400 mg by mouth nightly as needed       melatonin 1 MG TABS tablet Take 0.5 mg by mouth nightly as needed for sleep       Omega-3 Fatty Acids (FISH OIL CONCENTRATE) 300 MG CAPS Take 300 mg by mouth daily       No Known Allergies  Recent Labs   Lab Test 04/02/19  0906 04/02/19  0841   A1C  --  5.2   CR 1.16  --    GFRESTIMATED 84  --    GFRESTBLACK >90  --    POTASSIUM 4.3  --       BP Readings from Last 3 Encounters:   06/17/19 119/74   04/25/19 119/75   04/02/19 138/78    Wt Readings from Last 3 Encounters:   06/17/19 89.4 kg (197 lb)   06/06/19 88.5 kg (195 lb)   04/25/19 92.1 kg (203 lb)                    Reviewed and updated as needed this visit by Provider  Tobacco  Allergies  Meds  Problems  Med Hx  Surg Hx  Fam Hx         Review of Systems   Rest of the ROS is Negative except see above and Problem list [stable]        Objective    There were no vitals taken for this visit.  Estimated body mass index is 23.36 kg/m  as calculated from the following:    Height as of 6/17/19: 1.956 m (6' 5\").    Weight as of 6/17/19: 89.4 kg (197 lb).  Physical Exam     GENERAL: Healthy, alert and no distress  EYES: Eyes grossly normal to inspection.  No discharge or erythema, or obvious scleral/conjunctival abnormalities.  RESP: No audible " wheeze, cough, or visible cyanosis.  No visible retractions or increased work of breathing.    MS: No gross musculoskeletal defects noted.  Normal range of motion.  No visible edema.  SKIN: Visible skin clear. No significant rash, abnormal pigmentation or lesions.  NEURO: Cranial nerves grossly intact.  Mentation and speech appropriate for age.  PSYCH: Mentation appears normal, affect normal/bright, judgement and insight intact, normal speech and appearance well-groomed.  Bite alyx lower leg  No rash      Diagnostic Test Results:  Pending         Assessment & Plan     1. Tick bite, initial encounter  SEE [facility] care orders  The potential side effects of this medication have been discussed with the patient.  Call if any significant problems with these are experienced.    - **Lyme Disease Enriqueta with reflex to WB Serum FUTURE 14d; Future  - doxycycline hyclate (VIBRAMYCIN) 100 MG capsule; Take 1 capsule (100 mg) by mouth 2 times daily for 1 day  Dispense: 2 capsule; Refill: 0     Follow up if any joint pain or flu Like symptoms,rash      Return if symptoms worsen or fail to improve.    Mariam Segovia MD  [facility]      Video-Visit Details    Type of service:  Video Visit    Video End Time:2.46 PM    Originating Location (pt. Location): Home    Distant Location (provider location):  Provider Home    Platform used for Video Visit: AmWell    Return if symptoms worsen or fail to improve.        2:52 PM -visit completed  Mariam Segovia MD
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
## 2020-06-24 PROCEDURE — 86618 LYME DISEASE ANTIBODY: CPT | Performed by: FAMILY MEDICINE

## 2020-06-24 PROCEDURE — 36415 COLL VENOUS BLD VENIPUNCTURE: CPT | Performed by: FAMILY MEDICINE

## 2020-06-25 LAB — B BURGDOR IGG+IGM SER QL: 0.13 (ref 0–0.89)

## 2020-09-16 VITALS — WEIGHT: 210 LBS | HEIGHT: 77 IN | BODY MASS INDEX: 24.79 KG/M2

## 2020-09-16 ASSESSMENT — MIFFLIN-ST. JEOR: SCORE: 2024.93

## 2020-09-16 NOTE — PATIENT INSTRUCTIONS
Your BMI is Body mass index is 24.9 kg/m .  Weight management is a personal decision.  If you are interested in exploring weight loss strategies, the following discussion covers the approaches that may be successful. Body mass index (BMI) is one way to tell whether you are at a healthy weight, overweight, or obese. It measures your weight in relation to your height.  A BMI of 18.5 to 24.9 is in the healthy range. A person with a BMI of 25 to 29.9 is considered overweight, and someone with a BMI of 30 or greater is considered obese. More than two-thirds of American adults are considered overweight or obese.  Being overweight or obese increases the risk for further weight gain. Excess weight may lead to heart disease and diabetes.  Creating and following plans for healthy eating and physical activity may help you improve your health.  Weight control is part of healthy lifestyle and includes exercise, emotional health, and healthy eating habits. Careful eating habits lifelong are the mainstay of weight control. Though there are significant health benefits from weight loss, long-term weight loss with diet alone may be very difficult to achieve- studies show long-term success with dietary management in less than 10% of people. Attaining a healthy weight may be especially difficult to achieve in those with severe obesity. In some cases, medications, devices and surgical management might be considered.  What can you do?  If you are overweight or obese and are interested in methods for weight loss, you should discuss this with your provider.     Consider reducing daily calorie intake by 500 calories.     Keep a food journal.     Avoiding skipping meals, consider cutting portions instead.    Diet combined with exercise helps maintain muscle while optimizing fat loss. Strength training is particularly important for building and maintaining muscle mass. Exercise helps reduce stress, increase energy, and improves fitness.  Increasing exercise without diet control, however, may not burn enough calories to loose weight.       Start walking three days a week 10-20 minutes at a time    Work towards walking thirty minutes five days a week     Eventually, increase the speed of your walking for 1-2 minutes at time    In addition, we recommend that you review healthy lifestyles and methods for weight loss available through the National Institutes of Health patient information sites:  http://win.niddk.nih.gov/publications/index.htm    And look into health and wellness programs that may be available through your health insurance provider, employer, local community center, or araseli club.    Weight management plan: Patient was referred to their PCP to discuss a diet and exercise plan.

## 2020-09-16 NOTE — PROGRESS NOTES
"Stewart Painter is a 30 year old male who is being evaluated via a billable video visit.      The patient has been notified of following:     \"This video visit will be conducted via a call between you and your physician/provider. We have found that certain health care needs can be provided without the need for an in-person physical exam.  This service lets us provide the care you need with a video conversation.  If a prescription is necessary we can send it directly to your pharmacy.  If lab work is needed we can place an order for that and you can then stop by our lab to have the test done at a later time.    Video visits are billed at different rates depending on your insurance coverage.  Please reach out to your insurance provider with any questions.    If during the course of the call the physician/provider feels a video visit is not appropriate, you will not be charged for this service.\"    Patient has given verbal consent for Video visit? Yes  How would you like to obtain your AVS? MyChart  If you are dropped from the video visit, the video invite should be resent to: Send to e-mail at: [email or web address]  Will anyone else be joining your video visit? No      Video-Visit Details    Type of service:  Video Visit    Video Start Time: 0930  Video End Time: 0945    Originating Location (pt. Location): Home    Distant Location (provider location):  [facility]     Platform used for Video Visit: TrashOut    Virtual visit for follow-up of restless leg syndrome and presumed periodic limb movement disorder managed with gabapentin.    Assessment:  - Restless leg syndrome  -Periodic limb movement disorder    Plan:  -Appears to be very well controlled and stable on gabapentin 100 mg taken 1-2 hours before bed.  -We will continue current regiment, refill sent today.  -Plan for follow-up in 1 year.  Would consider rechecking ferritin in the next 1-2 years.    30-year-old male with minimal other past " medical history.    Last office visit on June 17, 2019 with Nan Rodriguez.  Follow-up on restless leg syndrome, PLMD, and adequate sleep time on weekends and review of sleep behaviors.  In general, appearing well controlled with gabapentin 100 mg 1.5 hours before bed and iron supplementation.    Most recent ferritin on January 13, 2020 at 138 ng/mL.    Overall, he feels that his sleep is a much higher quality and has not no difficulty falling asleep when he does take his gabapentin.  He finds that just 1 tablet (100 mg) is effective.  He has not been taking oral iron supplementation for a number of months.  Otherwise, no other significant health changes.  He works as a  and has been working remotely even prior to the coronavirus pandemic.    10 point ROS of systems including Constitutional, Eyes, Respiratory, Cardiovascular, Gastroenterology, Genitourinary, Integumentary, Muscularskeletal, Psychiatric were all negative except for pertinent positives noted in my HPI.    Physical Exam  Constitutional:       General: He is not in acute distress.     Appearance: Normal appearance. He is not ill-appearing, toxic-appearing or diaphoretic.   HENT:      Head: Normocephalic and atraumatic.      Right Ear: External ear normal.      Left Ear: External ear normal.      Nose: Nose normal.   Eyes:      Conjunctiva/sclera: Conjunctivae normal.   Pulmonary:      Effort: Pulmonary effort is normal. No respiratory distress.   Skin:     Coloration: Skin is not jaundiced or pale.      Findings: No bruising or erythema.   Neurological:      General: No focal deficit present.      Mental Status: He is alert and oriented to person, place, and time.   Psychiatric:         Mood and Affect: Mood normal.         Behavior: Behavior normal.         Thought Content: Thought content normal.         Judgment: Judgment normal.           Osvaldo Gagnon MD, MD

## 2020-09-17 ENCOUNTER — VIRTUAL VISIT (OUTPATIENT)
Dept: SLEEP MEDICINE | Facility: CLINIC | Age: 30
End: 2020-09-17
Payer: COMMERCIAL

## 2020-09-17 DIAGNOSIS — G47.61 PLMD (PERIODIC LIMB MOVEMENT DISORDER): ICD-10-CM

## 2020-09-17 DIAGNOSIS — G25.81 RESTLESS LEGS SYNDROME (RLS): Primary | ICD-10-CM

## 2020-09-17 PROCEDURE — 99213 OFFICE O/P EST LOW 20 MIN: CPT | Mod: 95 | Performed by: FAMILY MEDICINE

## 2020-09-17 RX ORDER — GABAPENTIN 100 MG/1
CAPSULE ORAL
Qty: 90 CAPSULE | Refills: 3 | Status: SHIPPED | OUTPATIENT
Start: 2020-09-17 | End: 2021-03-17

## 2020-09-28 ENCOUNTER — OFFICE VISIT (OUTPATIENT)
Dept: ORTHOPEDICS | Facility: CLINIC | Age: 30
End: 2020-09-28
Payer: COMMERCIAL

## 2020-09-28 ENCOUNTER — ANCILLARY PROCEDURE (OUTPATIENT)
Dept: GENERAL RADIOLOGY | Facility: CLINIC | Age: 30
End: 2020-09-28
Attending: FAMILY MEDICINE
Payer: COMMERCIAL

## 2020-09-28 VITALS — WEIGHT: 210 LBS | HEIGHT: 77 IN | BODY MASS INDEX: 24.79 KG/M2

## 2020-09-28 DIAGNOSIS — M79.644 FINGER PAIN, RIGHT: ICD-10-CM

## 2020-09-28 DIAGNOSIS — M20.011 MALLET FINGER OF RIGHT HAND: Primary | ICD-10-CM

## 2020-09-28 ASSESSMENT — MIFFLIN-ST. JEOR: SCORE: 2024.93

## 2020-09-28 NOTE — PROGRESS NOTES
SPORTS & ORTHOPEDIC WALK-IN VISIT 9/28/2020    Primary Care Physician: Dr. Davila    On Friday he was playing Volleyball and hit it wrong. He has been buddying taping and icing haven't been helping much. The majority of his pain is at the DIP.     Reason for visit:     What part of your body is injured / painful?  right little finger    What caused the injury /pain? Volleyball    How long ago did your injury occur or pain begin? 9/25/20    What are your most bothersome symptoms? Pain and Swelling    How would you characterize your symptom?  sharp    What makes your symptoms better? Ice and tape    What makes your symptoms worse? Movement    Have you been previously seen for this problem? No    Medical History:    Any recent changes to your medical history? No    Any new medication prescribed since last visit? No    Have you had surgery on this body part before? No    Social History:    Occupation:      Handedness: Left    Exercise: 4-5 days/week    Review of Systems:    Do you have fever, chills, weight loss? No    Do you have any vision problems? No    Do you have any chest pain or edema? No    Do you have any shortness of breath or wheezing?  No    Do you have stomach problems? No    Do you have any numbness or focal weakness? No    Do you have diabetes? No    Do you have problems with bleeding or clotting? No    Do you have an rashes or other skin lesions? No

## 2020-09-28 NOTE — LETTER
9/28/2020         RE: Stewart Painter  [address] [id number]        Dear Colleague,    Thank you for referring your patient, Stewart Painter, to the [facility] SPORTS AND ORTHOPAEDIC WALK IN CLINIC. Please see a copy of my visit note below.          SPORTS & ORTHOPEDIC WALK-IN VISIT 9/28/2020    Primary Care Physician: Dr. Davila    On Friday he was playing Volleyball and hit it wrong. He has been buddying taping and icing haven't been helping much. The majority of his pain is at the DIP.     Reason for visit:     What part of your body is injured / painful?  right little finger    What caused the injury /pain? Volleyball    How long ago did your injury occur or pain begin? 9/25/20    What are your most bothersome symptoms? Pain and Swelling    How would you characterize your symptom?  sharp    What makes your symptoms better? Ice and tape    What makes your symptoms worse? Movement    Have you been previously seen for this problem? No    Medical History:    Any recent changes to your medical history? No    Any new medication prescribed since last visit? No    Have you had surgery on this body part before? No    Social History:    Occupation:      Handedness: Left    Exercise: 4-5 days/week    Review of Systems:    Do you have fever, chills, weight loss? No    Do you have any vision problems? No    Do you have any chest pain or edema? No    Do you have any shortness of breath or wheezing?  No    Do you have stomach problems? No    Do you have any numbness or focal weakness? No    Do you have diabetes? No    Do you have problems with bleeding or clotting? No    Do you have an rashes or other skin lesions? No         CHIEF COMPLAINT:  Pain of the Right Little Finger     HISTORY OF PRESENT ILLNESS  Mr. Painter is a pleasant 30 year old year old male who presents to clinic today with pain of right fifth finger.  Stewart explains that pain of fifth finger began abruptly last  Friday.  On Friday he was playing Volleyball and hit it wrong. The majority of his pain is at the DIP. Notes inability to fully extend it.  Swelling at dorsum of DIP.      Treatment to date includes buddying taping and icing haven't been helping much.      Reason for visit:     What part of your body is injured / painful?  right little finger    What caused the injury /pain? Volleyball    How long ago did your injury occur or pain begin? 9/25/20    What are your most bothersome symptoms? Pain and Swelling    How would you characterize your symptom?  sharp    What makes your symptoms better? Ice and tape    What makes your symptoms worse? Movement    Have you been previously seen for this problem? No    Additional history: as documented    MEDICAL HISTORY  Patient Active Problem List   Diagnosis     Restless legs syndrome (RLS)     PLMD (periodic limb movement disorder)     Mallet finger of right hand       Current Outpatient Medications   Medication Sig Dispense Refill     CREATINE PO Take 5 mg by mouth daily       gabapentin (NEURONTIN) 100 MG capsule 1 pill 1.5 hrs prior to bedtime, may increase by 1 pill every 3-5 days to a total of 3 if needed 90 capsule 3     magnesium oxide 200 MG TABS Take 400 mg by mouth nightly as needed       melatonin 1 MG TABS tablet Take 0.5 mg by mouth nightly as needed for sleep       Omega-3 Fatty Acids (FISH OIL CONCENTRATE) 300 MG CAPS Take 300 mg by mouth daily         No Known Allergies    Family History   Problem Relation Age of Onset     Hypertension Mother      No Known Problems Father      No Known Problems Sister      Heart Disease Maternal Grandfather 86     Heart Disease Paternal Grandmother 87     Heart Disease Paternal Grandfather 62     Diabetes No family hx of      Prostate Cancer No family hx of      Colon Cancer No family hx of        Additional medical/Social/Surgical histories reviewed in [facility] and updated as appropriate.     REVIEW OF SYSTEMS (9/30/2020)  A  "10-point review of systems was obtained and is negative except for as noted in the HPI.      PHYSICAL EXAM  Ht 1.956 m (6' 5\")   Wt 95.3 kg (210 lb)   BMI 24.90 kg/m      General  - normal appearance, in no obvious distress  HEENT  - conjunctivae not injected, moist mucous membranes  CV  - normal radial pulse  Pulm  - normal respiratory pattern, non-labored  Musculoskeletal - finger  - inspection: Swelling and ecchymosis at DIP joint  - palpation: Tenderness DIP joint dorsally, tender distal phalangeal base.  - ROM:  MP full ROM, PIP full range of motion, DIP with passive full flexion and extension, however actively unable to extend DIP joint beyond 30 degrees extension.  - strength:  Full flexion of MP, IP and DIP, 0/5 extension to resistance of DIP.  - special tests:  (-) varus  (-) valgus  Neuro  - no numbness, no motor deficit, grossly normal coordination, normal muscle tone  Skin  +ecchymosis DIP, no erythema, warmth, or induration, no obvious rash  Psych  - interactive, appropriate, normal mood and affect    IMAGING : XR Finger 3V. Final results and radiologist's interpretation, available in the [facility] health record. Images were reviewed with the patient/family members in the office today. My personal interpretation of the performed imaging is mallet fracture deformity at dorsal lip of distal phalanx.      ASSESSMENT & PLAN  Mr. Painter is a 30 year old year old male who presents to clinic today with acute fifth finger pain of right hand.    Diagnosis: Mallet fracture of right fifth finger.    -Stax splinting today, reiterated treatment plan including six weeks of continuous splinting in full extension.    -OT referral for custom orthoses to maximize extension to approximate fracture fragment/tendon.   -Follow up 2 weeks with radiographs and discussion of splinting    It was a pleasure seeing Stewart today.    Gunnar Garsia, , CAM  Primary Care Sports Medicine    "

## 2020-09-29 ENCOUNTER — THERAPY VISIT (OUTPATIENT)
Dept: OCCUPATIONAL THERAPY | Facility: CLINIC | Age: 30
End: 2020-09-29
Payer: COMMERCIAL

## 2020-09-29 DIAGNOSIS — M79.644 FINGER PAIN, RIGHT: Primary | ICD-10-CM

## 2020-09-29 DIAGNOSIS — M20.011 MALLET FINGER OF RIGHT HAND: ICD-10-CM

## 2020-09-29 PROCEDURE — 97760 ORTHOTIC MGMT&TRAING 1ST ENC: CPT | Mod: GO | Performed by: OCCUPATIONAL THERAPIST

## 2020-09-29 PROCEDURE — 97165 OT EVAL LOW COMPLEX 30 MIN: CPT | Mod: GO | Performed by: OCCUPATIONAL THERAPIST

## 2020-09-29 NOTE — PROGRESS NOTES
Hand Therapy Initial Evaluation    Current Date:  9/29/2020    Diagnosis: R small finger mallet injury  DOI: 09/25/20   Procedure:  none  Post:  Splinting started 9/28/20   POST: 1d    Precautions: NA    Subjective:  Stewart Painter is a 30 year old male.    Patient reports symptoms of the right small finger which occurred due to playing volleyball. Since onset symptoms are Gradually getting better.  General health as reported by patient is excellent.  Pertinent medical history includes:  Past Medical History:   Diagnosis Date     Acute renal failure (ARF) (H) 1989    Hospitalized at [facility]      Medical allergies:none.  Surgical history:   Past Surgical History:   Procedure Laterality Date     NEPHRECTOMY Right     soon after birth     Medication history: None.    Current occupation is   Job Tasks: Computer Work, Prolonged Sitting, Repetitive Tasks    Occupational Profile Information:  Left hand dominant  Prior functional level:  no limitations  Patient reports symptoms of pain, stiffness/loss of motion, weakness/loss of strength and edema  Special tests:  x-ray.    Previous treatment: stack splint  Barriers include:none  Mobility: No difficulty  Transportation: drives  Currently working in normal job without restrictions  Leisure activities/hobbies: volleyball, cycling, weight lifting    Objective:  Pain Level (Scale 0-10):   9/29/2020   At Rest 0/10   At Worst 6/10     Pain Description:  Date 9/29/2020   Location R DIP joint   Pain Quality Sharp   Frequency intermittent     Pain is worst  daytime   Exacerbated by  movement   Relieved by cold and rest   Progression improving     Edema (Circumference measured in cm)   9/29/2020 9/29/2020   Small L R   P1  5.3   PIP  5.2   P2  4.6   DIP  4.5     Sensation  WNL throughout all nerve distributions; per patient report    ROM  contraindicated    Strength  contraindicated    Assessment:  Patient presents with symptoms consistent with  diagnosis of right small finger mallet,  with conservative intervention.     Patient's limitations or Problem List includes:  Pain, Decreased ROM/motion and Increased edema of the right small finger which interferes with the patient's ability to perform Self Care Tasks (dressing, eating, bathing, hygiene/toileting), Work Tasks, Recreational Activities and Household Chores as compared to previous level of function.    Rehab Potential:  Excellent - Return to full activity, no limitations    Patient will benefit from skilled Occupational Therapy to increase ROM and overall strength and decrease pain and edema to return to previous activity level and resume normal daily tasks and to reach their rehab potential.    Barriers to Learning:  No barrier    Communication Issues:  Patient appears to be able to clearly communicate and understand verbal and written communication and follow directions correctly.    Chart Review: Chart Review    Identified Performance Deficits: bathing/showering, toileting, dressing, feeding, hygiene and grooming, health management and maintenance, home establishment and management, meal preparation and cleanup, work and leisure activities    Assessment of Occupational Performance:  3-5 Performance Deficits    Clinical Decision Making (Complexity): Low complexity    Treatment Explanation:  The following has been discussed with the patient:  RX ordered/plan of care  Anticipated outcomes  Possible risks and side effects    Plan:  Frequency:  2 X a month, once daily  Duration:  for 3 months    Treatment Plan:   Modalities:  US and Paraffin  Therapeutic Exercise:  AROM, AAROM, PROM, Tendon Gliding, Blocking, Isotonics, Isometrics and Stabilization  Manual Techniques:  Myofascial release  Orthotic Fabrication:  Static orthosis  Self Care:  Self Care Tasks  Discharge Plan:  Achieve all LTG.  Independent in home treatment program.  Reach maximal therapeutic benefit.    Home Exercise Program:  Elvira  Finger orthosis, full time, only removed for hygiene and tip kept straight during that time  icing    Next Visit:  prn

## 2020-09-30 NOTE — PROGRESS NOTES
CHIEF COMPLAINT:  Pain of the Right Little Finger       HISTORY OF PRESENT ILLNESS  Mr. Painter is a pleasant 30 year old year old male who presents to clinic today with pain of right fifth finger.  Stewart explains that pain of fifth finger began abruptly last Friday.  On Friday he was playing Volleyball and hit it wrong. The majority of his pain is at the DIP. Notes inability to fully extend it.  Swelling at dorsum of DIP.      Treatment to date includes buddying taping and icing haven't been helping much.      Reason for visit:     What part of your body is injured / painful?  right little finger    What caused the injury /pain? Volleyball    How long ago did your injury occur or pain begin? 9/25/20    What are your most bothersome symptoms? Pain and Swelling    How would you characterize your symptom?  sharp    What makes your symptoms better? Ice and tape    What makes your symptoms worse? Movement    Have you been previously seen for this problem? No    Additional history: as documented    MEDICAL HISTORY  Patient Active Problem List   Diagnosis     Restless legs syndrome (RLS)     PLMD (periodic limb movement disorder)     Mallet finger of right hand       Current Outpatient Medications   Medication Sig Dispense Refill     CREATINE PO Take 5 mg by mouth daily       gabapentin (NEURONTIN) 100 MG capsule 1 pill 1.5 hrs prior to bedtime, may increase by 1 pill every 3-5 days to a total of 3 if needed 90 capsule 3     magnesium oxide 200 MG TABS Take 400 mg by mouth nightly as needed       melatonin 1 MG TABS tablet Take 0.5 mg by mouth nightly as needed for sleep       Omega-3 Fatty Acids (FISH OIL CONCENTRATE) 300 MG CAPS Take 300 mg by mouth daily         No Known Allergies    Family History   Problem Relation Age of Onset     Hypertension Mother      No Known Problems Father      No Known Problems Sister      Heart Disease Maternal Grandfather 86     Heart Disease Paternal Grandmother 87     Heart Disease  "Paternal Grandfather 62     Diabetes No family hx of      Prostate Cancer No family hx of      Colon Cancer No family hx of        Additional medical/Social/Surgical histories reviewed in [facility] and updated as appropriate.     REVIEW OF SYSTEMS (9/30/2020)  A 10-point review of systems was obtained and is negative except for as noted in the HPI.      PHYSICAL EXAM  Ht 1.956 m (6' 5\")   Wt 95.3 kg (210 lb)   BMI 24.90 kg/m      General  - normal appearance, in no obvious distress  HEENT  - conjunctivae not injected, moist mucous membranes  CV  - normal radial pulse  Pulm  - normal respiratory pattern, non-labored  Musculoskeletal - finger  - inspection: Swelling and ecchymosis at DIP joint  - palpation: Tenderness DIP joint dorsally, tender distal phalangeal base.  - ROM:  MP full ROM, PIP full range of motion, DIP with passive full flexion and extension, however actively unable to extend DIP joint beyond 30 degrees extension.  - strength:  Full flexion of MP, IP and DIP, 0/5 extension to resistance of DIP.  - special tests:  (-) varus  (-) valgus  Neuro  - no numbness, no motor deficit, grossly normal coordination, normal muscle tone  Skin  +ecchymosis DIP, no erythema, warmth, or induration, no obvious rash  Psych  - interactive, appropriate, normal mood and affect    IMAGING : XR Finger 3V. Final results and radiologist's interpretation, available in the [facility] health record. Images were reviewed with the patient/family members in the office today. My personal interpretation of the performed imaging is mallet fracture deformity at dorsal lip of distal phalanx.      ASSESSMENT & PLAN  Mr. Painter is a 30 year old year old male who presents to clinic today with acute fifth finger pain of right hand.    Diagnosis: Mallet fracture of right fifth finger.    -Stax splinting today, reiterated treatment plan including six weeks of continuous splinting in full extension.    -OT referral for custom orthoses to maximize " extension to approximate fracture fragment/tendon.   -Follow up 2 weeks with radiographs and discussion of splinting    It was a pleasure seeing Stewart today.    Gunnar Garsia DO, CAQSM  Primary Care Sports Medicine

## 2020-10-13 ENCOUNTER — ANCILLARY PROCEDURE (OUTPATIENT)
Dept: GENERAL RADIOLOGY | Facility: CLINIC | Age: 30
End: 2020-10-13
Attending: FAMILY MEDICINE
Payer: COMMERCIAL

## 2020-10-13 ENCOUNTER — OFFICE VISIT (OUTPATIENT)
Dept: ORTHOPEDICS | Facility: CLINIC | Age: 30
End: 2020-10-13
Payer: COMMERCIAL

## 2020-10-13 VITALS — WEIGHT: 210 LBS | HEIGHT: 77 IN | BODY MASS INDEX: 24.79 KG/M2

## 2020-10-13 DIAGNOSIS — M20.011 MALLET FINGER OF RIGHT HAND: Primary | ICD-10-CM

## 2020-10-13 DIAGNOSIS — M20.011 MALLET FINGER OF RIGHT HAND: ICD-10-CM

## 2020-10-13 PROCEDURE — 73140 X-RAY EXAM OF FINGER(S): CPT | Mod: RT | Performed by: RADIOLOGY

## 2020-10-13 PROCEDURE — 99213 OFFICE O/P EST LOW 20 MIN: CPT | Performed by: FAMILY MEDICINE

## 2020-10-13 ASSESSMENT — MIFFLIN-ST. JEOR: SCORE: 2024.93

## 2020-10-13 NOTE — PROGRESS NOTES
SPORTS & ORTHOPEDIC WALK-IN FOLLOW-UP VISIT 10/13/2020    Most of the time he is 100% pain free, does notice occasional pain when putting pressure on the splint with gripping.  Overall, feeling much better.    Interval History:     Follow up reason: 2 wk f/u    Date of injury/onset: 9/25/20    Date last seen: 9/28/20    Following Therapeutic Plan: Yes     Pain: Improving    Function: Improving      Medical History:    Any recent changes to your medical history? No    Any new medication prescribed since last visit? No    Review of Systems:    Do you have fever, chills, weight loss? No    Do you have any vision problems? No    Do you have any chest pain or edema? No    Do you have any shortness of breath or wheezing?  No    Do you have stomach problems? No    Do you have any numbness or focal weakness? No    Do you have diabetes? No    Do you have problems with bleeding or clotting? No    Do you have an rashes or other skin lesions? No

## 2020-10-13 NOTE — LETTER
10/13/2020         RE: Stewart Painter  [address] [id number]        Dear Colleague,    Thank you for referring your patient, Stewart Painter, to the [facility] [place]. Please see a copy of my visit note below.          SPORTS & ORTHOPEDIC WALK-IN FOLLOW-UP VISIT 10/13/2020    Most of the time he is 100% pain free, does notice occasional pain when putting pressure on the splint with gripping.  Overall, feeling much better.    Interval History:     Follow up reason: 2 wk f/u    Date of injury/onset: 9/25/20    Date last seen: 9/28/20    Following Therapeutic Plan: Yes     Pain: Improving    Function: Improving      Medical History:    Any recent changes to your medical history? No    Any new medication prescribed since last visit? No    Review of Systems:    Do you have fever, chills, weight loss? No    Do you have any vision problems? No    Do you have any chest pain or edema? No    Do you have any shortness of breath or wheezing?  No    Do you have stomach problems? No    Do you have any numbness or focal weakness? No    Do you have diabetes? No    Do you have problems with bleeding or clotting? No    Do you have an rashes or other skin lesions? No           ESTABLISHED PATIENT FOLLOW-UP:  Follow Up of the Right Little Finger     HISTORY OF PRESENT ILLNESS  Mr. Painter is a pleasant 30 year old year old male who presents to clinic today for follow-up of mallet finger fracture of right fifth digit.    Most of the time he is 100% pain free, does notice occasional pain when putting pressure on the splint with gripping.  Overall, feeling much better.  Unfortunate incident last night where he woke up in his bed and his splint had fallen off. Otherwise had been wearing 24/7.      Interval History:     Follow up reason: 2 wk f/u    Date of injury/onset: 9/25/20    Date last seen: 9/28/20    Following Therapeutic Plan: Yes     Pain: Improving    Function:  "Improving    Additional medical/Social/Surgical histories reviewed in [facility] and updated as appropriate.    REVIEW OF SYSTEMS (10/13/2020)  CONSTITUTIONAL: Denies fever and weight loss  GASTROINTESTINAL: Denies abdominal pain, nausea, vomiting  MUSCULOSKELETAL: See HPI  SKIN: Denies any recent rash or lesion  NEUROLOGICAL: Denies numbness or focal weakness     PHYSICAL EXAM  Ht 1.956 m (6' 5\")   Wt 95.3 kg (210 lb)   BMI 24.90 kg/m      General  - normal appearance, in no obvious distress  Musculoskeletal -right fifth finger  - inspection: Swelling and ecchymosis at DIP joint  - palpation: Mild tenderness DIP joint dorsally, tender distal phalangeal base.  - ROM: Deferred flexion, resting in splint in full extension 0 degrees  - strength:  Full flexion of MP, IP joints 5/5.  Neuro  - no numbness, no motor deficit, grossly normal coordination, normal muscle tone  Skin  +ecchymosis DIP, no erythema, warmth, or induration, no obvious rash  Psych  - interactive, appropriate, normal mood and affect    IMAGING : Right fifth finger 2V. Final results and radiologist's interpretation, available in the [facility] health record. Images were reviewed with the patient/family members in the office today. My personal interpretation of the performed imaging is redemonstration of dorsal avulsion fracture with mild displacement.       ASSESSMENT & PLAN  Mr. Painter is a 30 year old year old male who presents to clinic today with Follow Up of the Right Little Finger    Diagnosis: Mallet fracture of right fifth finger    -Continue custom splinting 24/7  -Reviewed safe ways to don and doff splint  -Pain free, ice PRN if needed  -Follow up 5 weeks with XR finger. Start ROM if appropriate to determine whether extensor lag does exist.  Likely will continue night splinting additional month after this date.    It was a pleasure seeing Stewart.    Gunnar Garsia DO, CAQSM  Primary Care Sports Medicine      "

## 2020-10-13 NOTE — PROGRESS NOTES
"ESTABLISHED PATIENT FOLLOW-UP:  Follow Up of the Right Little Finger     HISTORY OF PRESENT ILLNESS  Mr. Painter is a pleasant 30 year old year old male who presents to clinic today for follow-up of mallet finger fracture of right fifth digit.    Most of the time he is 100% pain free, does notice occasional pain when putting pressure on the splint with gripping.  Overall, feeling much better.  Unfortunate incident last night where he woke up in his bed and his splint had fallen off. Otherwise had been wearing 24/7.      Interval History:     Follow up reason: 2 wk f/u    Date of injury/onset: 9/25/20    Date last seen: 9/28/20    Following Therapeutic Plan: Yes     Pain: Improving    Function: Improving    Additional medical/Social/Surgical histories reviewed in [facility] and updated as appropriate.    REVIEW OF SYSTEMS (10/13/2020)  CONSTITUTIONAL: Denies fever and weight loss  GASTROINTESTINAL: Denies abdominal pain, nausea, vomiting  MUSCULOSKELETAL: See HPI  SKIN: Denies any recent rash or lesion  NEUROLOGICAL: Denies numbness or focal weakness     PHYSICAL EXAM  Ht 1.956 m (6' 5\")   Wt 95.3 kg (210 lb)   BMI 24.90 kg/m      General  - normal appearance, in no obvious distress  Musculoskeletal -right fifth finger  - inspection: Swelling and ecchymosis at DIP joint  - palpation: Mild tenderness DIP joint dorsally, tender distal phalangeal base.  - ROM: Deferred flexion, resting in splint in full extension 0 degrees  - strength:  Full flexion of MP, IP joints 5/5.  Neuro  - no numbness, no motor deficit, grossly normal coordination, normal muscle tone  Skin  +ecchymosis DIP, no erythema, warmth, or induration, no obvious rash  Psych  - interactive, appropriate, normal mood and affect    IMAGING : Right fifth finger 2V. Final results and radiologist's interpretation, available in the [facility] health record. Images were reviewed with the patient/family members in the office today. My personal interpretation of the " performed imaging is redemonstration of dorsal avulsion fracture with mild displacement.       ASSESSMENT & PLAN  Mr. Painter is a 30 year old year old male who presents to clinic today with Follow Up of the Right Little Finger    Diagnosis: Mallet fracture of right fifth finger    -Continue custom splinting 24/7  -Reviewed safe ways to don and doff splint  -Pain free, ice PRN if needed  -Follow up 5 weeks with XR finger. Start ROM if appropriate to determine whether extensor lag does exist.  Likely will continue night splinting additional month after this date.    It was a pleasure seeing Stewart.    Gunnar Garsia DO, CAM  Primary Care Sports Medicine

## 2020-11-30 ENCOUNTER — ANCILLARY PROCEDURE (OUTPATIENT)
Dept: GENERAL RADIOLOGY | Facility: CLINIC | Age: 30
End: 2020-11-30
Attending: FAMILY MEDICINE
Payer: COMMERCIAL

## 2020-11-30 ENCOUNTER — OFFICE VISIT (OUTPATIENT)
Dept: ORTHOPEDICS | Facility: CLINIC | Age: 30
End: 2020-11-30
Payer: COMMERCIAL

## 2020-11-30 VITALS — HEIGHT: 77 IN | WEIGHT: 210 LBS | BODY MASS INDEX: 24.79 KG/M2

## 2020-11-30 DIAGNOSIS — M20.011 MALLET FINGER OF RIGHT HAND: Primary | ICD-10-CM

## 2020-11-30 PROCEDURE — 99213 OFFICE O/P EST LOW 20 MIN: CPT | Performed by: FAMILY MEDICINE

## 2020-11-30 PROCEDURE — 73140 X-RAY EXAM OF FINGER(S): CPT | Mod: RT | Performed by: RADIOLOGY

## 2020-11-30 ASSESSMENT — MIFFLIN-ST. JEOR: SCORE: 2024.93

## 2020-11-30 NOTE — LETTER
11/30/2020         RE: Stewart Painter  [address] [id number]        Dear Colleague,    Thank you for referring your patient, Stewart Painter, to the [facility]THOP[facility]ALK[facility] [place]. Please see a copy of my visit note below.          SPORTS & ORTHOPEDIC WALK-IN FOLLOW-UP VISIT 11/30/2020    Interval History:     Follow up reason: Right 5th finger    Date of injury/onset: 9/25/2020    Date last seen: 10/13/2020    Following Therapeutic Plan: Yes     Pain: Improving    Function: Improving    Interval History: Overall he has been feeling much better with no pain. He has been in the splint since his last visit.    Medical History:    Any recent changes to your medical history? No    Any new medication prescribed since last visit? No    Review of Systems:    Do you have fever, chills, weight loss? No    Do you have any vision problems? No    Do you have any chest pain or edema? No    Do you have any shortness of breath or wheezing?  No    Do you have stomach problems? No    Do you have any numbness or focal weakness? No    Do you have diabetes? No    Do you have problems with bleeding or clotting? No    Do you have an rashes or other skin lesions? No             ESTABLISHED PATIENT FOLLOW-UP:  Follow Up of the Right Little Finger       HISTORY OF PRESENT ILLNESS  Mr. Painter is a pleasant 30 year old year old male who presents to clinic today for follow-up of right little finger.      Follow up reason: Right 5th finger    Date of injury/onset: 9/25/2020    Date last seen: 10/13/2020    Following Therapeutic Plan: Yes     Pain: Improving    Function: Improving    Interval History: Overall he has been feeling much better with no pain. He has been in the splint since his last visit. No swelling.      Additional medical/Social/Surgical histories reviewed in [facility] and updated as appropriate.    REVIEW OF SYSTEMS (11/30/2020)  CONSTITUTIONAL: Denies fever and weight  "loss  GASTROINTESTINAL: Denies abdominal pain, nausea, vomiting  MUSCULOSKELETAL: See HPI  SKIN: Denies any recent rash or lesion  NEUROLOGICAL: Denies numbness or focal weakness     PHYSICAL EXAM  Ht 1.956 m (6' 5\")   Wt 95.3 kg (210 lb)   BMI 24.90 kg/m      General  - normal appearance, in no obvious distress  HEENT  - conjunctivae not injected, moist mucous membranes  CV  - normal radial pulse  Pulm  - normal respiratory pattern, non-labored  Musculoskeletal - right fifth finger  - inspection: no atrophy, normal joint alignment, no swelling. No sag and finger held at 0 degrees at DIP  - palpation: no bony or soft tissue tenderness at middle or distal phalanx  - ROM:  MCP 90 deg flexion   0 deg extension   PIP 90 deg flexion   0 deg extension   DIP 45 deg flexion   0 deg extension  - strength: grossly intact DIP flexion to light resistance. 5/5 MCP flexion. 5/5 wrist flexion and extension.  - special tests:  (-) varus  (-) valgus  Neuro  - no numbness, no motor deficit, grossly normal coordination, normal muscle tone  Skin  - no ecchymosis, mild erthema at dorsal finger near proximal nail bed at site of splint.   Psych  - interactive, appropriate, normal mood and affect    IMAGING : XR finger right 2VW. Final results and radiologist's interpretation, available in the [facility] health record. Images were reviewed with the patient/family members in the office today. My personal interpretation of the performed imaging is      ASSESSMENT & PLAN  Mr. Painter is a 30 year old year old male who presents to clinic today with Follow Up of the Right Little Finger    Diagnosis: Mallet fracture of right fifth finger    -Start ROM and integrate daily use  -Splint for heavy gripping or ie. Yard work  -Splinting at night x 2 weeks  -Red flags: Sagging finger, splint at all times additional 2 weeks and make appt  -Follow up PRN for sagging, persisting stiffness.  Consider OT, however due to COVID pandemic, will start hand exercises " at home. Demonstrated today in office.    It was a pleasure seeing Stewart.    Gunnar Garsia DO, JESSICAM  Primary Care Sports Medicine

## 2020-11-30 NOTE — PROGRESS NOTES
"ESTABLISHED PATIENT FOLLOW-UP:  Follow Up of the Right Little Finger       HISTORY OF PRESENT ILLNESS  Mr. Painter is a pleasant 30 year old year old male who presents to clinic today for follow-up of right little finger.      Follow up reason: Right 5th finger    Date of injury/onset: 9/25/2020    Date last seen: 10/13/2020    Following Therapeutic Plan: Yes     Pain: Improving    Function: Improving    Interval History: Overall he has been feeling much better with no pain. He has been in the splint since his last visit. No swelling.      Additional medical/Social/Surgical histories reviewed in [facility] and updated as appropriate.    REVIEW OF SYSTEMS (11/30/2020)  CONSTITUTIONAL: Denies fever and weight loss  GASTROINTESTINAL: Denies abdominal pain, nausea, vomiting  MUSCULOSKELETAL: See HPI  SKIN: Denies any recent rash or lesion  NEUROLOGICAL: Denies numbness or focal weakness     PHYSICAL EXAM  Ht 1.956 m (6' 5\")   Wt 95.3 kg (210 lb)   BMI 24.90 kg/m      General  - normal appearance, in no obvious distress  HEENT  - conjunctivae not injected, moist mucous membranes  CV  - normal radial pulse  Pulm  - normal respiratory pattern, non-labored  Musculoskeletal - right fifth finger  - inspection: no atrophy, normal joint alignment, no swelling. No sag and finger held at 0 degrees at DIP  - palpation: no bony or soft tissue tenderness at middle or distal phalanx  - ROM:  MCP 90 deg flexion   0 deg extension   PIP 90 deg flexion   0 deg extension   DIP 45 deg flexion   0 deg extension  - strength: grossly intact DIP flexion to light resistance. 5/5 MCP flexion. 5/5 wrist flexion and extension.  - special tests:  (-) varus  (-) valgus  Neuro  - no numbness, no motor deficit, grossly normal coordination, normal muscle tone  Skin  - no ecchymosis, mild erthema at dorsal finger near proximal nail bed at site of splint.   Psych  - interactive, appropriate, normal mood and affect    IMAGING : XR finger right 2VW. Final " results and radiologist's interpretation, available in the [facility] health record. Images were reviewed with the patient/family members in the office today. My personal interpretation of the performed imaging is      ASSESSMENT & PLAN  Mr. Painter is a 30 year old year old male who presents to clinic today with Follow Up of the Right Little Finger    Diagnosis: Mallet fracture of right fifth finger    -Start ROM and integrate daily use  -Splint for heavy gripping or ie. Yard work  -Splinting at night x 2 weeks  -Red flags: Sagging finger, splint at all times additional 2 weeks and make appt  -Follow up PRN for sagging, persisting stiffness.  Consider OT, however due to COVID pandemic, will start hand exercises at home. Demonstrated today in office.    It was a pleasure seeing Stewart.    Gunnar Garsia DO, CAQSM  Primary Care Sports Medicine

## 2020-11-30 NOTE — PROGRESS NOTES
SPORTS & ORTHOPEDIC WALK-IN FOLLOW-UP VISIT 11/30/2020    Interval History:     Follow up reason: Right 5th finger    Date of injury/onset: 9/25/2020    Date last seen: 10/13/2020    Following Therapeutic Plan: Yes     Pain: Improving    Function: Improving    Interval History: Overall he has been feeling much better with no pain. He has been in the splint since his last visit.    Medical History:    Any recent changes to your medical history? No    Any new medication prescribed since last visit? No    Review of Systems:    Do you have fever, chills, weight loss? No    Do you have any vision problems? No    Do you have any chest pain or edema? No    Do you have any shortness of breath or wheezing?  No    Do you have stomach problems? No    Do you have any numbness or focal weakness? No    Do you have diabetes? No    Do you have problems with bleeding or clotting? No    Do you have an rashes or other skin lesions? No

## 2021-01-03 ENCOUNTER — HEALTH MAINTENANCE LETTER (OUTPATIENT)
Age: 31
End: 2021-01-03

## 2021-01-04 PROBLEM — M20.011 MALLET FINGER OF RIGHT HAND: Status: RESOLVED | Noted: 2020-09-29 | Resolved: 2021-01-04

## 2021-02-13 NOTE — NURSING NOTE
"28 year old  Chief Complaint   Patient presents with     Physical     Establish Care     Sleep Problem     daytime sleepiness       Blood pressure 138/78, pulse 59, temperature 97.7  F (36.5  C), temperature source Oral, resp. rate 16, height 1.954 m (6' 4.93\"), weight 91.5 kg (201 lb 12 oz), SpO2 99 %. Body mass index is 23.97 kg/m .  There is no problem list on file for this patient.      Wt Readings from Last 2 Encounters:   04/02/19 91.5 kg (201 lb 12 oz)     BP Readings from Last 3 Encounters:   04/02/19 138/78         Current Outpatient Medications   Medication     CREATINE PO     magnesium oxide 200 MG TABS     melatonin 1 MG TABS tablet     Omega-3 Fatty Acids (FISH OIL CONCENTRATE) 300 MG CAPS     No current facility-administered medications for this visit.        Social History     Tobacco Use     Smoking status: Never Smoker     Smokeless tobacco: Never Used   Substance Use Topics     Alcohol use: Yes     Frequency: 2-4 times a month     Drinks per session: 1 or 2     Binge frequency: Never     Drug use: No       Health Maintenance Due   Topic Date Due     PREVENTIVE CARE VISIT  1989     PHQ-2 Q1 YR  08/09/2001     HIV SCREEN (SYSTEM ASSIGNED)  08/09/2007     DTAP/TDAP/TD IMMUNIZATION (1 - Tdap) 08/09/2014     INFLUENZA VACCINE (1) 09/01/2018       No results found for: PAP      April 2, 2019 8:03 AM    " SBAR faxed to floor and verified received with Liang Silva RN  02/13/21 [id number]

## 2021-07-22 DIAGNOSIS — G25.81 RESTLESS LEGS SYNDROME (RLS): Primary | ICD-10-CM

## 2021-07-22 RX ORDER — GABAPENTIN 300 MG/1
CAPSULE ORAL
Qty: 30 CAPSULE | Refills: 3 | Status: SHIPPED | OUTPATIENT
Start: 2021-07-22 | End: 2021-12-08

## 2021-10-10 ENCOUNTER — HEALTH MAINTENANCE LETTER (OUTPATIENT)
Age: 31
End: 2021-10-10

## 2021-12-08 ENCOUNTER — MYC MEDICAL ADVICE (OUTPATIENT)
Dept: SLEEP MEDICINE | Facility: CLINIC | Age: 31
End: 2021-12-08
Payer: COMMERCIAL

## 2021-12-08 ENCOUNTER — TELEPHONE (OUTPATIENT)
Dept: SLEEP MEDICINE | Facility: CLINIC | Age: 31
End: 2021-12-08
Payer: COMMERCIAL

## 2021-12-08 DIAGNOSIS — G25.81 RESTLESS LEGS SYNDROME (RLS): ICD-10-CM

## 2021-12-08 RX ORDER — GABAPENTIN 300 MG/1
CAPSULE ORAL
Qty: 30 CAPSULE | Refills: 3 | Status: SHIPPED | OUTPATIENT
Start: 2021-12-08 | End: 2022-04-05

## 2021-12-08 NOTE — TELEPHONE ENCOUNTER
Please see my chart message below, [place].    Pending Prescriptions:                       Disp   Refills    gabapentin (NEURONTIN) 300 MG capsule     30 cap*3            Sig: Take 1 capsule (300mg) by mouth at Bedtime.    Last Written Prescription Date:  7/22/2021  Last Fill Quantity: 30,   # refills: 3  Last Office Visit with G, P or [facility] prescribing provider: 9/17/2020  Future Office visit:   [place].

## 2022-01-29 ENCOUNTER — HEALTH MAINTENANCE LETTER (OUTPATIENT)
Age: 32
End: 2022-01-29

## 2022-04-04 DIAGNOSIS — G25.81 RESTLESS LEGS SYNDROME (RLS): ICD-10-CM

## 2022-04-04 NOTE — TELEPHONE ENCOUNTER
Pending Prescriptions:                       Disp   Refills    gabapentin (NEURONTIN) 300 MG capsule     30 cap*3            Sig: Take 1 capsule (300mg) by mouth at Bedtime.    Last Written Prescription Date:  18/8/21  Last Fill Quantity: 30,   # refills: 3  Last Office Visit with [facility], [facility] or [facility] prescribing provider: 9/17/20  Future Office visit:   4/11/22

## 2022-04-05 RX ORDER — GABAPENTIN 300 MG/1
CAPSULE ORAL
Qty: 30 CAPSULE | Refills: 3 | Status: SHIPPED | OUTPATIENT
Start: 2022-04-05 | End: 2022-08-16

## 2022-08-16 DIAGNOSIS — G25.81 RESTLESS LEGS SYNDROME (RLS): ICD-10-CM

## 2022-08-16 RX ORDER — GABAPENTIN 300 MG/1
CAPSULE ORAL
Qty: 30 CAPSULE | Refills: 3 | Status: SHIPPED | OUTPATIENT
Start: 2022-08-16

## 2022-09-18 ENCOUNTER — HEALTH MAINTENANCE LETTER (OUTPATIENT)
Age: 32
End: 2022-09-18

## 2023-01-29 ENCOUNTER — HEALTH MAINTENANCE LETTER (OUTPATIENT)
Age: 33
End: 2023-01-29

## 2023-11-30 NOTE — TELEPHONE ENCOUNTER
Called pt and got him scheduled for f/u Dr. Gagnon since pt hasn't been seen since 2020. Also routed to provider as pt needs recheck for ferritin.        Naveed Montague, Visit facilitator     Sydney Breen
